# Patient Record
Sex: FEMALE | Race: BLACK OR AFRICAN AMERICAN | URBAN - METROPOLITAN AREA
[De-identification: names, ages, dates, MRNs, and addresses within clinical notes are randomized per-mention and may not be internally consistent; named-entity substitution may affect disease eponyms.]

---

## 2022-10-13 ENCOUNTER — OFFICE VISIT (OUTPATIENT)
Dept: ORTHOPEDIC SURGERY | Age: 62
End: 2022-10-13

## 2022-10-13 VITALS — WEIGHT: 170 LBS | HEIGHT: 62 IN | BODY MASS INDEX: 31.28 KG/M2

## 2022-10-13 DIAGNOSIS — M25.561 ACUTE PAIN OF RIGHT KNEE: Primary | ICD-10-CM

## 2022-10-13 RX ORDER — TRIAMTERENE AND HYDROCHLOROTHIAZIDE 37.5; 25 MG/1; MG/1
1 CAPSULE ORAL EVERY MORNING
COMMUNITY

## 2022-10-13 RX ORDER — POTASSIUM CHLORIDE 1500 MG/1
1 TABLET, FILM COATED, EXTENDED RELEASE ORAL DAILY
COMMUNITY
Start: 2022-09-15

## 2022-10-13 RX ORDER — GABAPENTIN 100 MG/1
2 CAPSULE ORAL DAILY
COMMUNITY
Start: 2022-08-09

## 2022-10-13 RX ORDER — ACETAZOLAMIDE 250 MG/1
250 TABLET ORAL DAILY
COMMUNITY
Start: 2020-11-02

## 2022-10-13 RX ORDER — MELOXICAM 15 MG/1
15 TABLET ORAL DAILY
COMMUNITY
Start: 2021-06-08

## 2022-10-13 RX ORDER — POTASSIUM CHLORIDE 20 MEQ/1
20 TABLET, EXTENDED RELEASE ORAL 2 TIMES DAILY
COMMUNITY
Start: 2017-02-13

## 2022-10-13 NOTE — PROGRESS NOTES
12 [facility]  History and Physical      Date:  10/13/2022    Name:  Riky Cunha  Address:  [address] [place] [address] [address]    :  1960      Age:   65 y.o. Chief Complaint    Knee Pain (Right knee pain)      History of Present Illness:  Riky Cunha is a 65 y.o. female who presents for right-sided lateral knee pain. She states that she had a meniscal tear in  that was treated with partial meniscectomy. Since that time she has had gradually worsening achy dull pain in the lateral aspect of her knee. She has done physical therapy in the past.  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 She has utilized an off  brace and found that this did give her benefit her symptoms. She has previously had a series of hyaluronic acid injections. Past Medical History:   Diagnosis Date    Kidney disease         No past surgical history on file. No family history on file. Social History     Socioeconomic History    Marital status:      Spouse name: None    Number of children: None    Years of education: None    Highest education level: None   Tobacco Use    Smoking status: Former    Smokeless tobacco: Never       Current Outpatient Medications   Medication Sig Dispense Refill    albuterol (PROVENTIL) (2.5 MG/3ML) 0.083% nebulizer solution Take 2.5 mg by nebulization every 6 hours as needed. fluticasone-salmeterol (ADVAIR DISKUS) 100-50 MCG/DOSE diskus inhaler Inhale 1 puff into the lungs every 12 hours. losartan (COZAAR) 50 MG tablet Take 50 mg by mouth daily. atorvastatin (LIPITOR) 20 MG tablet Take 20 mg by mouth daily. amLODIPine (NORVASC) 10 MG tablet Take 10 mg by mouth daily. traMADol (ULTRAM) 50 MG tablet Take 50 mg by mouth every 6 hours as needed. fluticasone (VERAMYST) 27.5 MCG/SPRAY nasal spray 2 sprays by Nasal route daily. Glucosamine-Chondroit-Vit C-Mn (GLUCOSAMINE 1500 COMPLEX PO) Take  by mouth. ferrous sulfate 325 (65 FE) MG tablet Take 325 mg by mouth daily (with breakfast). No current facility-administered medications for this visit. Allergies   Allergen Reactions    Atorvastatin Other (See Comments)     Myalgias    Lisinopril          Review of Systems:  A 14 point review of systems was completed by the patient and is available in the media section of the scanned medical record and was reviewed. Vital Signs:   Ht 5' 2\" (1.575 m)   Wt 170 lb (77.1 kg)   BMI 31.09 kg/m²     General Exam:    General: Crissy Rodrigues is a healthy and well appearing 65y.o. year old female who is sitting comfortably in our office in no acute distress. Neuro: Alert & Oriented x 3,  normal,  no focal deficits noted. Normal mood & affect. Eyes: Sclera clear  Ears: Normal external ear  Mouth: Patient wearing a mask  Pulm: Respirations unlabored and regular   Skin: Warm, well perfused      Knee Examination: Right    Inspection: Moderate effusion, otherwise no ecchymosis, discoloration, erythema, excessive warmth. no gross deformities, no signs of infection. Palpation: There is no patellofemoral crepitus, there is moderate lateral joint line tenderness. No other osseous or soft tissue tenderness around the knee. Negative calf tenderness    Range of Motion:   0-130 degrees without pain or difficulty. Appropriate patellar mobility. Strength:  5/5 quadriceps, 5/5 hamstrings    Special Tests: Pseudolaxity on the lateral side with incomplete correction towards neutral otherwise no ligament instability, valgus and varus stress test are stable at 0 and 30°. Lachman's exhibits a solid endpoint. Negative posterior drawer.   Negative Homans sign    Gait: antalgic, without the use of assistive devices    Alignment: Valgus alignment bilaterally    Neuro: Sensation equal bilateral lower extremities    Vascular: 2+ posterior tibialis pulse      Contralateral Knee Comparison Examination: Left    Inspection:   No effusion, ecchymosis, discoloration, erythema, excessive warmth. no gross deformities, no signs of infection. Palpation: There is no patellofemoral crepitus, there is no medial or lateral joint line tenderness. No other osseous or soft tissue tenderness around the knee. Negative calf tenderness    Range of Motion:   0-130 degrees without pain or difficulty. Appropriate patellar mobility. Strength:  5/5 quadriceps, 5/5 hamstrings    Special Tests:   No ligament instability, valgus and varus stress test are stable at 0 and 30°. Lachman's exhibits a solid endpoint. Negative posterior drawer. Negative Homans sign        Radiology:    X-rays obtained and reviewed in office: AP and merchant view of both knees and a lateral of the right knee. Impression: No fractures, dislocations, visible tumors, or signs of acute trauma. There is complete loss of joint space in the lateral tibiofemoral compartment with reasonably preserved medial joint space there's reasonably preserved joint spacing in the patellofemoral joints bilaterally. KL grade 4. Patella is riding appropriately in the trochlear groove with no subluxation or tilt noted. Office Procedures:  Orders Placed This Encounter   Procedures    XR KNEE RIGHT (3 VIEWS)     Standing Status:   Future     Number of Occurrences:   1     Standing Expiration Date:   10/13/2023     Order Specific Question:   Reason for exam:     Answer:   right knee pain            Assessment: This patient is a 65 y.o. 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 Acute pain of right knee Yes       No orders of the defined types were placed in this encounter.       Medical decision making:  Patient's workup and evaluation were reviewed with the patient in the office today. Imaging was reviewed with the patient. An extensive discussion today in clinic with respect to options going forwards. She does describe that she has had reasonable relief of symptoms when using an off  brace however finds her current off  brace to be too bulky. As such we will fit her and see if she is interested in utilizing the smaller off  brace that we have available today in clinic. She did also wish to proceed with cortisone injection today    Right Knee Injection Procedure: The indications and risks of steroid injection as well as treatment alternatives were discussed with the patient who consented to the procedure. Under sterile conditions and after informed consent was obtained the patient was given an injection into the right knee. 2cc 40 mg of Kenalog were placed in the knee after it was prepped with chlorhexidine. This resulted in good relief of symptoms. There were no complications. The patient was advised to ice the knee this evening and to avoid vigorous activities for the next 2 days. They were advised to call us if there was any erythema, enduration, swelling or increasing pain. We had a good discussion with her today with respect to expectations around total knee arthroplasty and addressed her questions. She understandably wishes to further research this before proceeding. All the patient's questions were answered while in the clinic. The patient is understanding of all instructions and agrees with the plan. Treatment Plan:    Cortisone injection today  Referral to PT  Off  brace  Follow-up in clinic on an as-needed basis should she wish to repeat cortisone injection or proceed with total knee arthroplasty  Activity modification/Rest   Ice 20 minutes ever 1-2 hours PRN  Take medications as prescribed/instructed  Elevation   Lightweight exercise/low impact exercise  Appropriate diet/weight management      Follow up:  On an as-needed basis    This dictation was performed with a verbal recognition program (DRAGON) and it was checked for errors. It is possible that there are still dictated errors within this office note. If so, please bring any errors to my attention for an addendum. All efforts were made to ensure that this office note is accurate. Supervising Physician Attestation:  I, Dr. Ailin Owens, personally performed the services described in this documentation as above, and it is both accurate and complete and I agree with all pertinent clinical information. I personally interviewed the patient and performed a physical examination and medical decision making. I discussed the patient's condition and treatment options and have  reviewed and agree with the past medical, family and social history unless otherwise noted. All of the patient's questions were answered. I personally supervised the Orthopedic and Sportsmedicine Fellow when when noted in the evaluation and treatment plan of the patient. Board Certified Orthopaedic Surgeon  [facility] and [facility] and [address] and [employer]  Professor of [address] [facility]    Orthopaedic Surgeon, Clinical Fellow  [facility] and [facility]   On behalf of      10/13/22 2:39 PM      The encounter with Isha Marley was supervised by Dr. Ailin Owens who personally examined the patient and reviewed the plan. This dictation was performed with a verbal recognition program (DRAGON) and it was checked for errors. It is possible that there are still dictated errors within this office note. If so, please bring any errors to my attention for an addendum. All efforts were made to ensure that this office note is accurate.     This patient exhibits moderate complexity for obtaining an independent history, reviewing past medical records, independent interpretation/review of diagnostic imaging, and further coordinating care. The patient exhibits low risk given that the patient is being treated conservatively at this time.

## 2023-06-06 ENCOUNTER — OFFICE VISIT (OUTPATIENT)
Dept: ORTHOPEDIC SURGERY | Age: 63
End: 2023-06-06

## 2023-06-06 VITALS — WEIGHT: 170 LBS | BODY MASS INDEX: 31.28 KG/M2 | HEIGHT: 62 IN

## 2023-06-06 DIAGNOSIS — M25.561 ACUTE PAIN OF RIGHT KNEE: Primary | ICD-10-CM

## 2023-06-06 NOTE — PROGRESS NOTES
mobility. Strength:  5/5 quadriceps, 5/5 hamstrings    Special Tests:  No ligament instability, valgus and varus stress test are stable at 0 and 30°. Lachman's exhibits a solid endpoint. Negative posterior drawer. Negative Homans sign    Gait: Non antalgic, without the use of assistive devices    Alignment: Correctable valgus deformity    Neuro: Sensation equal bilateral lower extremities    Vascular: 2+ posterior tibialis pulse      Contralateral Knee Comparison Examination: Left    Inspection:   No effusion, ecchymosis, discoloration, erythema, excessive warmth. no gross deformities, no signs of infection. Palpation: There is mild patellofemoral crepitus, there is no joint line tenderness. No other osseous or soft tissue tenderness around the knee. Negative calf tenderness    Range of Motion:   -5-130 degrees without pain or difficulty. Appropriate patellar mobility. Strength:  5/5 quadriceps, 5/5 hamstrings    Special Tests:   No ligament instability, valgus and varus stress test are stable at 0 and 30°. Lachman's exhibits a solid endpoint. Negative posterior drawer. Negative Homans sign    Gait:  Non antalgic, without the use of assistive devices    Alignment: mild Varus deformity    Neuro: Sensation equal bilateral lower extremities    Vascular: 2+ posterior tibialis pulse        Radiology:   X-rays obtained and reviewed in office: AP and merchant view of both knees and a lateral of the right. Impression: No fractures, dislocations, visible tumors, or signs of acute trauma. The lateral femoral-tibial compartments exhibit complete loss of joint space. There's maintained joint spacing in the patellofemoral joints bilaterally. KL grade 4. Patella is riding appropriately in the trochlear groove with no subluxation or tilt noted. No osteophytes or bone growths noted. No loose bodies or foreign bodies.         Office Procedures:  Orders Placed This Encounter   Procedures    XR KNEE RIGHT (3

## 2023-06-07 DIAGNOSIS — M25.561 ACUTE PAIN OF RIGHT KNEE: Primary | ICD-10-CM

## 2023-06-26 ENCOUNTER — TELEPHONE (OUTPATIENT)
Dept: PULMONOLOGY | Age: 63
End: 2023-06-26

## 2023-06-26 DIAGNOSIS — J45.909 UNCOMPLICATED ASTHMA, UNSPECIFIED ASTHMA SEVERITY, UNSPECIFIED WHETHER PERSISTENT: Primary | ICD-10-CM

## 2023-06-27 ENCOUNTER — TELEPHONE (OUTPATIENT)
Dept: ORTHOPEDIC SURGERY | Age: 63
End: 2023-06-27

## 2023-06-27 DIAGNOSIS — Z01.818 PRE-OP EVALUATION: Primary | ICD-10-CM

## 2023-06-27 NOTE — TELEPHONE ENCOUNTER
Other PATIENT CALLED TO VERIFY IF SHE NEEDS A CHEST XRAY. STATES THAT SHE WILL NEED TO KNOW TODAY AND IF SO SHE COULD GET IT TODAY AS WELL.  PLS CALL TO ADVISE

## 2023-07-03 ENCOUNTER — TELEPHONE (OUTPATIENT)
Dept: ORTHOPEDIC SURGERY | Age: 63
End: 2023-07-03

## 2023-07-03 NOTE — TELEPHONE ENCOUNTER
PA requsted via [employer] by online thru [address] [facility] 69 w/clinicals.   Reference # [id number]

## 2023-07-07 ENCOUNTER — HOSPITAL ENCOUNTER (OUTPATIENT)
Dept: MRI IMAGING | Age: 63
Discharge: HOME | End: 2023-07-07
Attending: ORTHOPAEDIC SURGERY
Payer: COMMERCIAL

## 2023-07-07 ENCOUNTER — HOSPITAL ENCOUNTER (OUTPATIENT)
Dept: GENERAL RADIOLOGY | Age: 63
Discharge: HOME | End: 2023-07-07
Attending: ORTHOPAEDIC SURGERY
Payer: COMMERCIAL

## 2023-07-07 ENCOUNTER — TELEPHONE (OUTPATIENT)
Dept: ORTHOPEDIC SURGERY | Age: 63
End: 2023-07-07

## 2023-07-07 DIAGNOSIS — M25.561 ACUTE PAIN OF RIGHT KNEE: ICD-10-CM

## 2023-07-07 DIAGNOSIS — M17.11 OSTEOARTHRITIS OF RIGHT KNEE, UNSPECIFIED OSTEOARTHRITIS TYPE: ICD-10-CM

## 2023-07-07 DIAGNOSIS — Z01.818 PRE-OP EVALUATION: ICD-10-CM

## 2023-07-07 DIAGNOSIS — M17.11 OSTEOARTHRITIS OF RIGHT KNEE, UNSPECIFIED OSTEOARTHRITIS TYPE: Primary | ICD-10-CM

## 2023-07-07 PROCEDURE — 73721 MRI JNT OF LWR EXTRE W/O DYE: CPT

## 2023-07-07 PROCEDURE — 77073 BONE LENGTH STUDIES: CPT

## 2023-07-07 PROCEDURE — 71046 X-RAY EXAM CHEST 2 VIEWS: CPT

## 2023-07-07 NOTE — TELEPHONE ENCOUNTER
General Question     Subject: CHECKLIST - PRE OP   Patient and /or Facility Request: Brent Desir Number: [phone]     Pt HAS DONE MRI AND CHEST XR AND KNEE. SHE HAS PRE OP PHYS SCHEDULED FOR 07/18.      IS THERE ANYTHING ELSE THAT SHE NEEDS TO DO?

## 2023-07-07 NOTE — TELEPHONE ENCOUNTER
Auth: NPR  Date: 07/26/23 thru 10/26/23  Reference # [id number]  Spoke with: Online  Type of SX: Outpatient  Location: [facility]  CPT: 53488   DX: M17.11  SX area: Rt knee  Insurance: [employer] [place]

## 2023-07-18 ENCOUNTER — TELEPHONE (OUTPATIENT)
Dept: ORTHOPEDIC SURGERY | Age: 63
End: 2023-07-18

## 2023-07-18 NOTE — TELEPHONE ENCOUNTER
Orthopedic Nurse Navigator Summary    Patient Name:  Alaina Augustin  Anticipated Date of Surgery:  07/26/23  Attended Pre-op Education Class:  Video sent to patient email 06/26/23  PCP: Trudy Rodríguez MD  Date of PCP visit for H&P: 07/18/23  Is patient in a Pain Management program:  Review of Medical history reveals history of: Diabetes, HTN, Hyperlipidemia, Carotid atherosclerosis, Asthma, FSGS, Spinal stenosis    Critical Lab Values  - Hemoglobin (g/dL):  Date: 04/12/23 Value 9.7  - Hematocrit(%): Date: 04/12/23  Value 29.4  - HgbA1C:  Date: 03/30/23 Value 6.1  - Albumin:  Date:   Value   - BUN:  Date: 07/13/23  Value 21  - Creatinine:  Date: 07/13/23  Value 1.04    Coronary Artery Disease/HTN/CHF history: Yes  Cardiologist:  Cardiac clearance necessary:  No  Date of cardiac clearance appt:  Final Cardiac recommendations: On any anticoagulation: No    Diabetes History:  Yes  Most recent HgbA1C: 6.1  Pulmonary:  COPD/Emphysema/Use of home oxygen: Patient has asthma  Alcohol use: Yes    BMI greater than 40 at time of scheduling: Additional medical concerns:   Additional recommendations for above concerns:  Attended Pre-Hab program:    Anticipated Discharge Disposition:  Home with OPT  Who will be with patient at home following discharge:    Equipment patient already has:    Bedroom on first or second floor:    Bathroom on first or second floor:    Weight bearing status:  wbat  Pre-op ambulatory status: painful ambulation  Number of entry steps:    Caregiver assistance:      Belinda Lincoln RN  Date:  07/18/23

## 2023-07-21 NOTE — PROGRESS NOTES
[facility] PRE-SURGICAL TESTING INSTRUCTIONS                      PRIOR TO PROCEDURE DATE:    1. PLEASE FOLLOW ANY INSTRUCTIONS GIVEN TO YOU PER YOUR SURGEON. 2. Arrange for someone to drive you home and be with you for the first 24 hours after discharge for your safety after your procedure for which you received sedation. Ensure it is someone we can share information with regarding your discharge. NOTE: At this time ONLY 2 ADULTS may accompany you   One person ENCOURAGED to stay at hospital entire time if outpatient surgery      3. You must contact your surgeon for instructions IF:  You are taking any blood thinners, aspirin, anti-inflammatory or vitamins. There is a change in your physical condition such as a cold, fever, rash, cuts, sores, or any other infection, especially near your surgical site. 4. Do not drink alcohol the day before or day of your procedure. Do not use any recreational marijuana at least 24 hours or street drugs (heroin, cocaine) at minimum 5 days prior to your procedure. 5. A Pre-Surgical History and Physical MUST be completed WITHIN 30 DAYS OR LESS prior to your procedure. by your Physician or an Urgent Care        THE DAY OF YOUR PROCEDURE:  1. Follow instructions for ARRIVAL TIME as DIRECTED BY YOUR SURGEON. 2. Enter the MAIN entrance from [address] and follow the signs to the free Parking [employer] or [employer] (offered free of charge 7 am-5pm). 3. Enter the Main Entrance of the hospital (do not enter from the lower level of the parking garage). Upon entrance, check in with the  at the surgical information desk on your LEFT. Bring your insurance card and photo ID to register      4. DO NOT EAT ANYTHING 8 hours prior to arrival for surgery. You may have up to 8 ounces of water 4 hours prior to your arrival for surgery.    NOTE: ALL Gastric, Bariatric & Bowel surgery patients - you MUST follow your surgeon's instructions regarding please bring it with you on the day of your procedure. 10. If you use oxygen at home, please bring your oxygen tank with you to hospital..     11. We recommend that valuable personal belongings such as cash, cell phones, e-tablets, or jewelry, be left at home during your stay. The hospital will not be responsible for valuables that are not secured in the hospital safe. However, if your insurance requires a co-pay, you may want to bring a method of payment, i.e., Check or credit card, if you wish to pay your co-pay the day of surgery. 12. If you are to stay overnight, you may bring a bag with personal items. Please have any large items you may need brought in by your family after your arrival to your hospital room. 15. If you have a Living Will or Durable Power of , please bring a copy on the day of your procedure. How we keep you safe and work to prevent surgical site infections:   1. Health care workers should always check your ID bracelet to verify your name and birth date. You will be asked many times to state your name, date of birth, and allergies. 2. Health care workers should always clean their hands with soap or alcohol gel before providing care to you. It is okay to ask anyone if they cleaned their hands before they touch you. 3. You will be actively involved in verifying the type of procedure you are having and ensuring the correct surgical site. This will be confirmed multiple times prior to your procedure. Do NOT son your surgery site UNLESS instructed to by your surgeon. 4. When you are in the operating room, your surgical site will be cleansed with a special soap, and in most cases, you will be given an antibiotic before the surgery begins. What to expect AFTER your procedure? 1. Immediately following your procedure, your will be taken to the PACU for the first phase of your recovery.   Your nurse will help you recover from any potential side effects of

## 2023-07-21 NOTE — PROGRESS NOTES
7/21 ABNORMAL LABS FAXED TO DR Samir Albrecht OFFICE-    7/21 0917 Office notified unable to reach patient. Office staff Dilcia weeks gave patient preop instructions and packet, ALSO SHE SAID SHE WILL ASK PT IF SHE GETS HER TO CALL [facility]  -    7/21 1145 SPOKE W PT, Total Joint video emailed & reviewed to patient by [address]. Hibiclens instructions reviewed to use x 5 days preop. JOSE screening done. TJ book, IS instructions, TJ video link, and fall contract placed on chart for DOS. -DG    7/25/23 @ 43059 Messaged E Thang at surgeon office and asked if he  needs any further labs on pt   /NR     7/25/23 @ 1310  ETheodore Desir put  order in for CBCD to be drawn today  /NR

## 2023-07-25 ENCOUNTER — HOSPITAL ENCOUNTER (OUTPATIENT)
Dept: PHYSICAL THERAPY | Age: 63
Setting detail: THERAPIES SERIES
Discharge: HOME | End: 2023-07-25
Payer: COMMERCIAL

## 2023-07-25 ENCOUNTER — ANESTHESIA EVENT (OUTPATIENT)
Dept: OPERATING ROOM | Age: 63
End: 2023-07-25
Payer: COMMERCIAL

## 2023-07-25 ENCOUNTER — TELEPHONE (OUTPATIENT)
Dept: ORTHOPEDIC SURGERY | Age: 63
End: 2023-07-25

## 2023-07-25 ENCOUNTER — OFFICE VISIT (OUTPATIENT)
Dept: ORTHOPEDIC SURGERY | Age: 63
End: 2023-07-25

## 2023-07-25 VITALS — HEIGHT: 62 IN | WEIGHT: 187 LBS | BODY MASS INDEX: 34.41 KG/M2

## 2023-07-25 DIAGNOSIS — M25.561 ACUTE PAIN OF RIGHT KNEE: ICD-10-CM

## 2023-07-25 DIAGNOSIS — M17.0 PRIMARY OSTEOARTHRITIS OF BOTH KNEES: ICD-10-CM

## 2023-07-25 DIAGNOSIS — Z01.818 PRE-OP EXAMINATION: Primary | ICD-10-CM

## 2023-07-25 DIAGNOSIS — Z01.818 PRE-OP EXAMINATION: ICD-10-CM

## 2023-07-25 PROCEDURE — PREOPEXAM PRE-OP EXAM: Performed by: PHYSICIAN ASSISTANT

## 2023-07-25 PROCEDURE — 97110 THERAPEUTIC EXERCISES: CPT | Performed by: PHYSICAL THERAPIST

## 2023-07-25 PROCEDURE — 97530 THERAPEUTIC ACTIVITIES: CPT | Performed by: PHYSICAL THERAPIST

## 2023-07-25 PROCEDURE — MISC250 COMPRESSION STOCKING: Performed by: PHYSICIAN ASSISTANT

## 2023-07-26 ENCOUNTER — HOSPITAL ENCOUNTER (OUTPATIENT)
Age: 63
Setting detail: OBSERVATION
Discharge: HOME | End: 2023-07-28
Attending: ORTHOPAEDIC SURGERY | Admitting: ORTHOPAEDIC SURGERY
Payer: COMMERCIAL

## 2023-07-26 ENCOUNTER — ANESTHESIA (OUTPATIENT)
Dept: OPERATING ROOM | Age: 63
End: 2023-07-26
Payer: COMMERCIAL

## 2023-07-26 DIAGNOSIS — Z98.890 S/P KNEE SURGERY: Primary | ICD-10-CM

## 2023-07-26 DIAGNOSIS — Z96.651 S/P TOTAL KNEE ARTHROPLASTY, RIGHT: ICD-10-CM

## 2023-07-26 LAB
BASOPHILS # BLD: 0 K/UL (ref 0–0.2)
BASOPHILS NFR BLD: 0.8 %
DEPRECATED RDW RBC AUTO: 14.7 % (ref 12.4–15.4)
EOSINOPHIL # BLD: 0.3 K/UL (ref 0–0.6)
EOSINOPHIL NFR BLD: 5.4 %
HCT VFR BLD AUTO: 41.1 % (ref 36–48)
HGB BLD-MCNC: 13.6 G/DL (ref 12–16)
LYMPHOCYTES # BLD: 2.1 K/UL (ref 1–5.1)
LYMPHOCYTES NFR BLD: 39 %
MCH RBC QN AUTO: 29.1 PG (ref 26–34)
MCHC RBC AUTO-ENTMCNC: 33 G/DL (ref 31–36)
MCV RBC AUTO: 88.1 FL (ref 80–100)
MONOCYTES # BLD: 0.4 K/UL (ref 0–1.3)
MONOCYTES NFR BLD: 7.7 %
NEUTROPHILS # BLD: 2.6 K/UL (ref 1.7–7.7)
NEUTROPHILS NFR BLD: 47.1 %
PLATELET # BLD AUTO: 307 K/UL (ref 135–450)
PMV BLD AUTO: 8.5 FL (ref 5–10.5)
RBC # BLD AUTO: 4.66 M/UL (ref 4–5.2)
WBC # BLD AUTO: 5.5 K/UL (ref 4–11)

## 2023-07-26 PROCEDURE — 3600000015 HC SURGERY LEVEL 5 ADDTL 15MIN: Performed by: ORTHOPAEDIC SURGERY

## 2023-07-26 PROCEDURE — C1713 ANCHOR/SCREW BN/BN,TIS/BN: HCPCS | Performed by: ORTHOPAEDIC SURGERY

## 2023-07-26 PROCEDURE — 6360000002 HC RX W HCPCS: Performed by: NURSE ANESTHETIST, CERTIFIED REGISTERED

## 2023-07-26 PROCEDURE — 6360000002 HC RX W HCPCS: Performed by: ANESTHESIOLOGY

## 2023-07-26 PROCEDURE — C9399 UNCLASSIFIED DRUGS OR BIOLOG: HCPCS | Performed by: NURSE ANESTHETIST, CERTIFIED REGISTERED

## 2023-07-26 PROCEDURE — 6370000000 HC RX 637 (ALT 250 FOR IP): Performed by: ANESTHESIOLOGY

## 2023-07-26 PROCEDURE — 2500000003 HC RX 250 WO HCPCS: Performed by: ORTHOPAEDIC SURGERY

## 2023-07-26 PROCEDURE — A4216 STERILE WATER/SALINE, 10 ML: HCPCS | Performed by: ORTHOPAEDIC SURGERY

## 2023-07-26 PROCEDURE — 2700000000 HC OXYGEN THERAPY PER DAY

## 2023-07-26 PROCEDURE — C1776 JOINT DEVICE (IMPLANTABLE): HCPCS | Performed by: ORTHOPAEDIC SURGERY

## 2023-07-26 PROCEDURE — 6370000000 HC RX 637 (ALT 250 FOR IP): Performed by: ORTHOPAEDIC SURGERY

## 2023-07-26 PROCEDURE — 2709999900 HC NON-CHARGEABLE SUPPLY: Performed by: ORTHOPAEDIC SURGERY

## 2023-07-26 PROCEDURE — 3600000005 HC SURGERY LEVEL 5 BASE: Performed by: ORTHOPAEDIC SURGERY

## 2023-07-26 PROCEDURE — 6360000002 HC RX W HCPCS: Performed by: ORTHOPAEDIC SURGERY

## 2023-07-26 PROCEDURE — 7100000001 HC PACU RECOVERY - ADDTL 15 MIN: Performed by: ORTHOPAEDIC SURGERY

## 2023-07-26 PROCEDURE — 2580000003 HC RX 258: Performed by: ANESTHESIOLOGY

## 2023-07-26 PROCEDURE — 6360000002 HC RX W HCPCS: Performed by: NURSE PRACTITIONER

## 2023-07-26 PROCEDURE — 2580000003 HC RX 258: Performed by: ORTHOPAEDIC SURGERY

## 2023-07-26 PROCEDURE — G0378 HOSPITAL OBSERVATION PER HR: HCPCS

## 2023-07-26 PROCEDURE — 94761 N-INVAS EAR/PLS OXIMETRY MLT: CPT

## 2023-07-26 PROCEDURE — 94640 AIRWAY INHALATION TREATMENT: CPT

## 2023-07-26 PROCEDURE — 7100000000 HC PACU RECOVERY - FIRST 15 MIN: Performed by: ORTHOPAEDIC SURGERY

## 2023-07-26 PROCEDURE — 3700000000 HC ANESTHESIA ATTENDED CARE: Performed by: ORTHOPAEDIC SURGERY

## 2023-07-26 PROCEDURE — 2500000003 HC RX 250 WO HCPCS: Performed by: NURSE ANESTHETIST, CERTIFIED REGISTERED

## 2023-07-26 PROCEDURE — 64447 NJX AA&/STRD FEMORAL NRV IMG: CPT | Performed by: ANESTHESIOLOGY

## 2023-07-26 PROCEDURE — 2580000003 HC RX 258: Performed by: NURSE ANESTHETIST, CERTIFIED REGISTERED

## 2023-07-26 PROCEDURE — A4217 STERILE WATER/SALINE, 500 ML: HCPCS | Performed by: ORTHOPAEDIC SURGERY

## 2023-07-26 PROCEDURE — 3700000001 HC ADD 15 MINUTES (ANESTHESIA): Performed by: ORTHOPAEDIC SURGERY

## 2023-07-26 DEVICE — JOURNEY 7.5 ROUND RESURF PAT 32MM STANDARD
Type: IMPLANTABLE DEVICE | Site: KNEE | Status: FUNCTIONAL
Brand: JOURNEY

## 2023-07-26 DEVICE — KNEE K1 TOT HEMI STD CEM IMPL CAPPED K1 SN: Type: IMPLANTABLE DEVICE | Site: KNEE | Status: FUNCTIONAL

## 2023-07-26 DEVICE — JOURNEY II BCS FEMORAL OXINIUM                                    RIGHT SIZE 4
Type: IMPLANTABLE DEVICE | Site: KNEE | Status: FUNCTIONAL
Brand: JOURNEY

## 2023-07-26 DEVICE — CEMENT BNE 20ML 40GM FULL DOSE PMMA W/O ANTIBIO M VISC: Type: IMPLANTABLE DEVICE | Site: KNEE | Status: FUNCTIONAL

## 2023-07-26 DEVICE — JOURNEY TIBIAL BASEPLATE NONPOROUS                                    RIGHT SIZE 3
Type: IMPLANTABLE DEVICE | Site: KNEE | Status: FUNCTIONAL
Brand: JOURNEY

## 2023-07-26 RX ORDER — HYDRALAZINE HYDROCHLORIDE 20 MG/ML
10 INJECTION INTRAMUSCULAR; INTRAVENOUS
Status: COMPLETED | OUTPATIENT
Start: 2023-07-26 | End: 2023-07-26

## 2023-07-26 RX ORDER — SODIUM CHLORIDE 0.9 % (FLUSH) 0.9 %
5-40 SYRINGE (ML) INJECTION PRN
Status: DISCONTINUED | OUTPATIENT
Start: 2023-07-26 | End: 2023-07-28 | Stop reason: HOSPADM

## 2023-07-26 RX ORDER — ACETAMINOPHEN 325 MG/1
650 TABLET ORAL EVERY 6 HOURS
Status: DISCONTINUED | OUTPATIENT
Start: 2023-07-26 | End: 2023-07-28 | Stop reason: HOSPADM

## 2023-07-26 RX ORDER — OXYCODONE HYDROCHLORIDE 5 MG/1
10 TABLET ORAL PRN
Status: COMPLETED | OUTPATIENT
Start: 2023-07-26 | End: 2023-07-26

## 2023-07-26 RX ORDER — SODIUM CHLORIDE 0.9 % (FLUSH) 0.9 %
5-40 SYRINGE (ML) INJECTION EVERY 12 HOURS SCHEDULED
Status: DISCONTINUED | OUTPATIENT
Start: 2023-07-26 | End: 2023-07-26 | Stop reason: HOSPADM

## 2023-07-26 RX ORDER — ONDANSETRON 4 MG/1
4 TABLET, ORALLY DISINTEGRATING ORAL 3 TIMES DAILY PRN
Qty: 21 TABLET | Refills: 0 | Status: SHIPPED | OUTPATIENT
Start: 2023-07-26

## 2023-07-26 RX ORDER — ROCURONIUM BROMIDE 10 MG/ML
INJECTION, SOLUTION INTRAVENOUS PRN
Status: DISCONTINUED | OUTPATIENT
Start: 2023-07-26 | End: 2023-07-26 | Stop reason: SDUPTHER

## 2023-07-26 RX ORDER — SODIUM CHLORIDE 0.9 % (FLUSH) 0.9 %
5-40 SYRINGE (ML) INJECTION EVERY 12 HOURS SCHEDULED
Status: DISCONTINUED | OUTPATIENT
Start: 2023-07-26 | End: 2023-07-28 | Stop reason: HOSPADM

## 2023-07-26 RX ORDER — OXYCODONE HYDROCHLORIDE AND ACETAMINOPHEN 5; 325 MG/1; MG/1
1 TABLET ORAL EVERY 6 HOURS PRN
Qty: 28 TABLET | Refills: 0 | Status: SHIPPED | OUTPATIENT
Start: 2023-07-26 | End: 2023-07-28 | Stop reason: HOSPADM

## 2023-07-26 RX ORDER — IPRATROPIUM BROMIDE AND ALBUTEROL SULFATE 2.5; .5 MG/3ML; MG/3ML
1 SOLUTION RESPIRATORY (INHALATION)
Status: COMPLETED | OUTPATIENT
Start: 2023-07-26 | End: 2023-07-26

## 2023-07-26 RX ORDER — SODIUM CHLORIDE 9 MG/ML
INJECTION, SOLUTION INTRAVENOUS PRN
Status: DISCONTINUED | OUTPATIENT
Start: 2023-07-26 | End: 2023-07-26 | Stop reason: HOSPADM

## 2023-07-26 RX ORDER — OXYCODONE HYDROCHLORIDE 5 MG/1
5 TABLET ORAL EVERY 4 HOURS PRN
Status: DISCONTINUED | OUTPATIENT
Start: 2023-07-26 | End: 2023-07-28 | Stop reason: HOSPADM

## 2023-07-26 RX ORDER — MIDAZOLAM HYDROCHLORIDE 1 MG/ML
INJECTION INTRAMUSCULAR; INTRAVENOUS PRN
Status: DISCONTINUED | OUTPATIENT
Start: 2023-07-26 | End: 2023-07-26 | Stop reason: SDUPTHER

## 2023-07-26 RX ORDER — MEPERIDINE HYDROCHLORIDE 25 MG/ML
12.5 INJECTION INTRAMUSCULAR; INTRAVENOUS; SUBCUTANEOUS EVERY 5 MIN PRN
Status: DISCONTINUED | OUTPATIENT
Start: 2023-07-26 | End: 2023-07-26 | Stop reason: HOSPADM

## 2023-07-26 RX ORDER — ACETAMINOPHEN 325 MG/1
650 TABLET ORAL
Status: DISCONTINUED | OUTPATIENT
Start: 2023-07-26 | End: 2023-07-26 | Stop reason: HOSPADM

## 2023-07-26 RX ORDER — ASPIRIN 81 MG/1
81 TABLET ORAL 2 TIMES DAILY
Status: DISCONTINUED | OUTPATIENT
Start: 2023-07-27 | End: 2023-07-28 | Stop reason: HOSPADM

## 2023-07-26 RX ORDER — ACETAZOLAMIDE 250 MG/1
250 TABLET ORAL DAILY
Status: DISCONTINUED | OUTPATIENT
Start: 2023-07-27 | End: 2023-07-28 | Stop reason: HOSPADM

## 2023-07-26 RX ORDER — OXYCODONE HYDROCHLORIDE 5 MG/1
10 TABLET ORAL EVERY 4 HOURS PRN
Status: DISCONTINUED | OUTPATIENT
Start: 2023-07-26 | End: 2023-07-28 | Stop reason: HOSPADM

## 2023-07-26 RX ORDER — FENTANYL CITRATE 50 UG/ML
INJECTION, SOLUTION INTRAMUSCULAR; INTRAVENOUS PRN
Status: DISCONTINUED | OUTPATIENT
Start: 2023-07-26 | End: 2023-07-26 | Stop reason: SDUPTHER

## 2023-07-26 RX ORDER — ATORVASTATIN CALCIUM 20 MG/1
20 TABLET, FILM COATED ORAL DAILY
Status: DISCONTINUED | OUTPATIENT
Start: 2023-07-26 | End: 2023-07-28 | Stop reason: HOSPADM

## 2023-07-26 RX ORDER — SENNA AND DOCUSATE SODIUM 50; 8.6 MG/1; MG/1
1 TABLET, FILM COATED ORAL 2 TIMES DAILY
Status: DISCONTINUED | OUTPATIENT
Start: 2023-07-26 | End: 2023-07-28 | Stop reason: HOSPADM

## 2023-07-26 RX ORDER — MIDAZOLAM HYDROCHLORIDE 1 MG/ML
INJECTION INTRAMUSCULAR; INTRAVENOUS
Status: COMPLETED
Start: 2023-07-26 | End: 2023-07-26

## 2023-07-26 RX ORDER — TRANEXAMIC ACID 100 MG/ML
INJECTION, SOLUTION INTRAVENOUS PRN
Status: DISCONTINUED | OUTPATIENT
Start: 2023-07-26 | End: 2023-07-26 | Stop reason: HOSPADM

## 2023-07-26 RX ORDER — ONDANSETRON 4 MG/1
4 TABLET, ORALLY DISINTEGRATING ORAL EVERY 8 HOURS PRN
Status: DISCONTINUED | OUTPATIENT
Start: 2023-07-26 | End: 2023-07-28 | Stop reason: HOSPADM

## 2023-07-26 RX ORDER — PROCHLORPERAZINE EDISYLATE 5 MG/ML
5 INJECTION INTRAMUSCULAR; INTRAVENOUS
Status: DISCONTINUED | OUTPATIENT
Start: 2023-07-26 | End: 2023-07-26 | Stop reason: HOSPADM

## 2023-07-26 RX ORDER — SODIUM CHLORIDE 9 MG/ML
INJECTION, SOLUTION INTRAVENOUS PRN
Status: DISCONTINUED | OUTPATIENT
Start: 2023-07-26 | End: 2023-07-28 | Stop reason: HOSPADM

## 2023-07-26 RX ORDER — ALBUTEROL SULFATE 2.5 MG/3ML
2.5 SOLUTION RESPIRATORY (INHALATION) EVERY 4 HOURS PRN
Status: DISCONTINUED | OUTPATIENT
Start: 2023-07-26 | End: 2023-07-28 | Stop reason: HOSPADM

## 2023-07-26 RX ORDER — SODIUM CHLORIDE 0.9 % (FLUSH) 0.9 %
5-40 SYRINGE (ML) INJECTION PRN
Status: DISCONTINUED | OUTPATIENT
Start: 2023-07-26 | End: 2023-07-26 | Stop reason: HOSPADM

## 2023-07-26 RX ORDER — POTASSIUM CHLORIDE 20 MEQ/1
20 TABLET, EXTENDED RELEASE ORAL DAILY
Status: DISCONTINUED | OUTPATIENT
Start: 2023-07-27 | End: 2023-07-28 | Stop reason: HOSPADM

## 2023-07-26 RX ORDER — ROPIVACAINE HYDROCHLORIDE 5 MG/ML
INJECTION, SOLUTION EPIDURAL; INFILTRATION; PERINEURAL
Status: COMPLETED
Start: 2023-07-26 | End: 2023-07-26

## 2023-07-26 RX ORDER — ASPIRIN 81 MG/1
81 TABLET ORAL 2 TIMES DAILY
Qty: 60 TABLET | Refills: 0 | Status: SHIPPED | OUTPATIENT
Start: 2023-07-26 | End: 2023-08-25

## 2023-07-26 RX ORDER — ONDANSETRON 2 MG/ML
4 INJECTION INTRAMUSCULAR; INTRAVENOUS EVERY 6 HOURS PRN
Status: DISCONTINUED | OUTPATIENT
Start: 2023-07-26 | End: 2023-07-28 | Stop reason: HOSPADM

## 2023-07-26 RX ORDER — SODIUM CHLORIDE, SODIUM LACTATE, POTASSIUM CHLORIDE, CALCIUM CHLORIDE 600; 310; 30; 20 MG/100ML; MG/100ML; MG/100ML; MG/100ML
INJECTION, SOLUTION INTRAVENOUS CONTINUOUS PRN
Status: DISCONTINUED | OUTPATIENT
Start: 2023-07-26 | End: 2023-07-26 | Stop reason: SDUPTHER

## 2023-07-26 RX ORDER — DEXAMETHASONE SODIUM PHOSPHATE 10 MG/ML
INJECTION, SOLUTION INTRAMUSCULAR; INTRAVENOUS
Status: DISPENSED
Start: 2023-07-26 | End: 2023-07-26

## 2023-07-26 RX ORDER — PROCHLORPERAZINE EDISYLATE 5 MG/ML
10 INJECTION INTRAMUSCULAR; INTRAVENOUS EVERY 6 HOURS PRN
Status: DISCONTINUED | OUTPATIENT
Start: 2023-07-26 | End: 2023-07-28 | Stop reason: HOSPADM

## 2023-07-26 RX ORDER — LOSARTAN POTASSIUM 50 MG/1
50 TABLET ORAL DAILY
Status: DISCONTINUED | OUTPATIENT
Start: 2023-07-26 | End: 2023-07-28 | Stop reason: HOSPADM

## 2023-07-26 RX ORDER — LIDOCAINE HYDROCHLORIDE 20 MG/ML
INJECTION, SOLUTION INTRAVENOUS PRN
Status: DISCONTINUED | OUTPATIENT
Start: 2023-07-26 | End: 2023-07-26 | Stop reason: SDUPTHER

## 2023-07-26 RX ORDER — OXYCODONE HYDROCHLORIDE 5 MG/1
5 TABLET ORAL PRN
Status: COMPLETED | OUTPATIENT
Start: 2023-07-26 | End: 2023-07-26

## 2023-07-26 RX ORDER — ONDANSETRON 2 MG/ML
4 INJECTION INTRAMUSCULAR; INTRAVENOUS
Status: DISCONTINUED | OUTPATIENT
Start: 2023-07-26 | End: 2023-07-26 | Stop reason: HOSPADM

## 2023-07-26 RX ORDER — GABAPENTIN 100 MG/1
200 CAPSULE ORAL DAILY
Status: DISCONTINUED | OUTPATIENT
Start: 2023-07-27 | End: 2023-07-28 | Stop reason: HOSPADM

## 2023-07-26 RX ORDER — SODIUM CHLORIDE, SODIUM LACTATE, POTASSIUM CHLORIDE, CALCIUM CHLORIDE 600; 310; 30; 20 MG/100ML; MG/100ML; MG/100ML; MG/100ML
INJECTION, SOLUTION INTRAVENOUS CONTINUOUS
Status: DISCONTINUED | OUTPATIENT
Start: 2023-07-26 | End: 2023-07-26 | Stop reason: HOSPADM

## 2023-07-26 RX ORDER — LABETALOL HYDROCHLORIDE 5 MG/ML
10 INJECTION, SOLUTION INTRAVENOUS
Status: COMPLETED | OUTPATIENT
Start: 2023-07-26 | End: 2023-07-26

## 2023-07-26 RX ORDER — AMLODIPINE BESYLATE 10 MG/1
10 TABLET ORAL DAILY
Status: DISCONTINUED | OUTPATIENT
Start: 2023-07-27 | End: 2023-07-28 | Stop reason: HOSPADM

## 2023-07-26 RX ORDER — FENTANYL CITRATE 50 UG/ML
25 INJECTION, SOLUTION INTRAMUSCULAR; INTRAVENOUS EVERY 5 MIN PRN
Status: COMPLETED | OUTPATIENT
Start: 2023-07-26 | End: 2023-07-26

## 2023-07-26 RX ORDER — PROPOFOL 10 MG/ML
INJECTION, EMULSION INTRAVENOUS PRN
Status: DISCONTINUED | OUTPATIENT
Start: 2023-07-26 | End: 2023-07-26 | Stop reason: SDUPTHER

## 2023-07-26 RX ORDER — SENNOSIDES A AND B 8.6 MG/1
1 TABLET, FILM COATED ORAL 2 TIMES DAILY
Qty: 60 TABLET | Refills: 0 | Status: SHIPPED | OUTPATIENT
Start: 2023-07-26 | End: 2024-07-25

## 2023-07-26 RX ORDER — ROPIVACAINE HYDROCHLORIDE 5 MG/ML
INJECTION, SOLUTION EPIDURAL; INFILTRATION; PERINEURAL
Status: COMPLETED | OUTPATIENT
Start: 2023-07-26 | End: 2023-07-26

## 2023-07-26 RX ADMIN — IPRATROPIUM BROMIDE AND ALBUTEROL SULFATE 1 DOSE: 2.5; .5 SOLUTION RESPIRATORY (INHALATION) at 16:58

## 2023-07-26 RX ADMIN — SENNOSIDES AND DOCUSATE SODIUM 1 TABLET: 50; 8.6 TABLET ORAL at 21:12

## 2023-07-26 RX ADMIN — LABETALOL HYDROCHLORIDE 10 MG: 5 INJECTION, SOLUTION INTRAVENOUS at 16:31

## 2023-07-26 RX ADMIN — ROPIVACAINE HYDROCHLORIDE 30 ML: 5 INJECTION, SOLUTION EPIDURAL; INFILTRATION; PERINEURAL at 10:55

## 2023-07-26 RX ADMIN — HYDROMORPHONE HYDROCHLORIDE 0.5 MG: 1 INJECTION, SOLUTION INTRAMUSCULAR; INTRAVENOUS; SUBCUTANEOUS at 14:49

## 2023-07-26 RX ADMIN — HYDROMORPHONE HYDROCHLORIDE 0.5 MG: 1 INJECTION, SOLUTION INTRAMUSCULAR; INTRAVENOUS; SUBCUTANEOUS at 21:02

## 2023-07-26 RX ADMIN — FENTANYL CITRATE 25 MCG: 50 INJECTION, SOLUTION INTRAMUSCULAR; INTRAVENOUS at 17:08

## 2023-07-26 RX ADMIN — FENTANYL CITRATE 100 MCG: 50 INJECTION, SOLUTION INTRAMUSCULAR; INTRAVENOUS at 11:51

## 2023-07-26 RX ADMIN — FENTANYL CITRATE 25 MCG: 50 INJECTION, SOLUTION INTRAMUSCULAR; INTRAVENOUS at 16:30

## 2023-07-26 RX ADMIN — LIDOCAINE HYDROCHLORIDE 100 MG: 20 INJECTION, SOLUTION INTRAVENOUS at 11:55

## 2023-07-26 RX ADMIN — PROPOFOL 80 MG: 10 INJECTION, EMULSION INTRAVENOUS at 11:58

## 2023-07-26 RX ADMIN — SUGAMMADEX 200 MG: 100 INJECTION, SOLUTION INTRAVENOUS at 14:10

## 2023-07-26 RX ADMIN — CEFAZOLIN 2000 MG: 2 INJECTION, POWDER, FOR SOLUTION INTRAMUSCULAR; INTRAVENOUS at 11:51

## 2023-07-26 RX ADMIN — PROCHLORPERAZINE EDISYLATE 10 MG: 5 INJECTION, SOLUTION INTRAMUSCULAR; INTRAVENOUS at 22:49

## 2023-07-26 RX ADMIN — SODIUM CHLORIDE, PRESERVATIVE FREE 10 ML: 5 INJECTION INTRAVENOUS at 21:04

## 2023-07-26 RX ADMIN — ROCURONIUM BROMIDE 100 MG: 10 INJECTION INTRAVENOUS at 11:58

## 2023-07-26 RX ADMIN — HYDROMORPHONE HYDROCHLORIDE 0.5 MG: 1 INJECTION, SOLUTION INTRAMUSCULAR; INTRAVENOUS; SUBCUTANEOUS at 14:56

## 2023-07-26 RX ADMIN — OXYCODONE HYDROCHLORIDE 10 MG: 5 TABLET ORAL at 17:58

## 2023-07-26 RX ADMIN — MIDAZOLAM HYDROCHLORIDE 2 MG: 2 INJECTION, SOLUTION INTRAMUSCULAR; INTRAVENOUS at 10:55

## 2023-07-26 RX ADMIN — SODIUM CHLORIDE, SODIUM LACTATE, POTASSIUM CHLORIDE, AND CALCIUM CHLORIDE: .6; .31; .03; .02 INJECTION, SOLUTION INTRAVENOUS at 11:51

## 2023-07-26 RX ADMIN — LABETALOL HYDROCHLORIDE 10 MG: 5 INJECTION, SOLUTION INTRAVENOUS at 15:46

## 2023-07-26 RX ADMIN — TRANEXAMIC ACID 1000 MG: 100 INJECTION, SOLUTION INTRAVENOUS at 13:43

## 2023-07-26 RX ADMIN — ONDANSETRON 4 MG: 2 INJECTION INTRAMUSCULAR; INTRAVENOUS at 18:32

## 2023-07-26 RX ADMIN — HYDROMORPHONE HYDROCHLORIDE 0.5 MG: 1 INJECTION, SOLUTION INTRAMUSCULAR; INTRAVENOUS; SUBCUTANEOUS at 15:27

## 2023-07-26 RX ADMIN — SODIUM CHLORIDE, POTASSIUM CHLORIDE, SODIUM LACTATE AND CALCIUM CHLORIDE: 600; 310; 30; 20 INJECTION, SOLUTION INTRAVENOUS at 10:34

## 2023-07-26 RX ADMIN — CEFAZOLIN 2000 MG: 2 INJECTION, POWDER, FOR SOLUTION INTRAMUSCULAR; INTRAVENOUS at 21:17

## 2023-07-26 RX ADMIN — HYDROMORPHONE HYDROCHLORIDE 0.5 MG: 1 INJECTION, SOLUTION INTRAMUSCULAR; INTRAVENOUS; SUBCUTANEOUS at 15:49

## 2023-07-26 ASSESSMENT — PAIN DESCRIPTION - ORIENTATION
ORIENTATION: RIGHT

## 2023-07-26 ASSESSMENT — PAIN DESCRIPTION - FREQUENCY
FREQUENCY: CONTINUOUS

## 2023-07-26 ASSESSMENT — PAIN DESCRIPTION - PAIN TYPE
TYPE: SURGICAL PAIN

## 2023-07-26 ASSESSMENT — PAIN DESCRIPTION - ONSET
ONSET: ON-GOING

## 2023-07-26 ASSESSMENT — PAIN SCALES - GENERAL
PAINLEVEL_OUTOF10: 0
PAINLEVEL_OUTOF10: 7
PAINLEVEL_OUTOF10: 7
PAINLEVEL_OUTOF10: 0
PAINLEVEL_OUTOF10: 0
PAINLEVEL_OUTOF10: 5
PAINLEVEL_OUTOF10: 0
PAINLEVEL_OUTOF10: 7
PAINLEVEL_OUTOF10: 5
PAINLEVEL_OUTOF10: 2
PAINLEVEL_OUTOF10: 0
PAINLEVEL_OUTOF10: 7
PAINLEVEL_OUTOF10: 7
PAINLEVEL_OUTOF10: 5
PAINLEVEL_OUTOF10: 6
PAINLEVEL_OUTOF10: 0
PAINLEVEL_OUTOF10: 10
PAINLEVEL_OUTOF10: 0
PAINLEVEL_OUTOF10: 9
PAINLEVEL_OUTOF10: 0

## 2023-07-26 ASSESSMENT — PAIN DESCRIPTION - LOCATION
LOCATION: LEG
LOCATION: KNEE

## 2023-07-26 ASSESSMENT — PAIN - FUNCTIONAL ASSESSMENT
PAIN_FUNCTIONAL_ASSESSMENT: PREVENTS OR INTERFERES SOME ACTIVE ACTIVITIES AND ADLS
PAIN_FUNCTIONAL_ASSESSMENT: PREVENTS OR INTERFERES SOME ACTIVE ACTIVITIES AND ADLS

## 2023-07-26 ASSESSMENT — PAIN DESCRIPTION - DESCRIPTORS
DESCRIPTORS: ACHING

## 2023-07-26 ASSESSMENT — ENCOUNTER SYMPTOMS: SHORTNESS OF BREATH: 0

## 2023-07-26 NOTE — PROGRESS NOTES
Patient arrived to PACU post RIGHT TOTAL KNEE REPLACEMENT - Right with Dr. Saima Churchill. VSS on arrival with oral airway and non-rebreather in place. CRNA gave PACU RN report at bedside stating patient received nerve block, otherwise, no complications during procedure. Dressing to surgical site clean, dry and intact. Patient shows no signs of pain at this time. Will continue to monitor.

## 2023-07-26 NOTE — ANESTHESIA POSTPROCEDURE EVALUATION
Department of Anesthesiology  Postprocedure Note    Patient: Guillermo Rubio  MRN: [id number]  YOB: 1960  Date of evaluation: 7/26/2023      Procedure Summary     Date: 07/26/23 Room / Location: Edda Rosenbaum [address] [address] On license of [facility]    Anesthesia Start: 5190 Anesthesia Stop: 3898    Procedure: RIGHT TOTAL KNEE REPLACEMENT (Right) Diagnosis:       Osteoarthritis of right knee, unspecified osteoarthritis type      (Osteoarthritis of right knee, unspecified osteoarthritis type [M17.11])    Surgeons: Sushila Shin MD Responsible Provider: Tawanna Thompson MD    Anesthesia Type: general ASA Status: 2          Anesthesia Type: No value filed.     Joi Phase I: Joi Score: 8    Joi Phase II:        Anesthesia Post Evaluation    Patient location during evaluation: PACU  Patient participation: complete - patient participated  Level of consciousness: awake and alert  Pain score: 0  Airway patency: patent  Nausea & Vomiting: no nausea and no vomiting  Complications: no  Cardiovascular status: hemodynamically stable  Respiratory status: acceptable  Hydration status: euvolemic

## 2023-07-26 NOTE — PROGRESS NOTES
Pt admitted to room 5506. VSS. Pt has been up and ambulating. Walked to bathroom and voided. All fall precautions in place. Call light within reach. Denies further needs.

## 2023-07-26 NOTE — PROGRESS NOTES
Procedure: peripheral block  MD: Dr. Kj Parra performed. Pt monitored closely on heart monitor, 2-3L NC, continuous pulse oximetry, EtCO2, and frequent BPs. Pt remained alert and oriented x4, calm and cooperative. pt tolerated procedure well.

## 2023-07-26 NOTE — BRIEF OP NOTE
Brief Postoperative Note      Patient: Park Lambert  YOB: 1960  MRN: [id number]    Date of Procedure: 7/26/2023    Pre-Op Diagnosis Codes:     * Osteoarthritis of right knee, unspecified osteoarthritis type [M17.11]    Post-Op Diagnosis: Same       Procedure(s):  RIGHT TOTAL KNEE REPLACEMENT    Surgeon(s):  Savanna Brown MD    Assistant:  Surgical Assistant: Beltran Mcknight  Fellow: Dto Cervantes MD  Physician Assistant: Yvonne Moon PA-C    Anesthesia: General    Estimated Blood Loss (mL): 770     Complications: None    Specimens:   * No specimens in log *    Implants:  * No implants in log *      Drains: * No LDAs found *    Findings: See dictated operative report.        Electronically signed by Dot Cervantes MD on 7/26/2023 at 1:56 PM

## 2023-07-26 NOTE — PROGRESS NOTES
Pt arrived from PACU. Pt able to ambulate with stand by assist, gait belt, and walker. Pt had 2 episodes of emesis that resolved with IV ondansetron and rest. Pts vitals are WDL. Pt resting comfortably in bed.

## 2023-07-26 NOTE — ANESTHESIA PROCEDURE NOTES
Peripheral Block    Patient location during procedure: pre-op  Reason for block: post-op pain management and at surgeon's request  Start time: 7/26/2023 10:55 AM  End time: 7/26/2023 11:00 AM  Staffing  Performed: anesthesiologist   Anesthesiologist: Adriel Mccord DO  Preanesthetic Checklist  Completed: patient identified, IV checked, site marked, risks and benefits discussed, surgical/procedural consents, equipment checked, pre-op evaluation, timeout performed, anesthesia consent given, oxygen available, monitors applied/VS acknowledged, fire risk safety assessment completed and verbalized and blood product R/B/A discussed and consented  Peripheral Block   Patient position: supine  Prep: ChloraPrep  Provider prep: mask and sterile gloves  Patient monitoring: cardiac monitor, continuous pulse ox, continuous capnometry, frequent blood pressure checks, IV access, oxygen and responsive to questions  Block type: Femoral  Adductor canal  Laterality: right  Injection technique: single-shot  Guidance: ultrasound guided    Needle   Needle type: insulated echogenic nerve stimulator needle   Needle gauge: 20 G  Needle localization: anatomical landmarks and ultrasound guidance  Test dose: negative  Needle length: 10 cm  Assessment   Injection assessment: negative aspiration for heme, no paresthesia on injection, local visualized surrounding nerve on ultrasound, no intravascular symptoms and low pressure verified by pressure monitor  Paresthesia pain: none  Slow fractionated injection: yes  Hemodynamics: stable  Real-time US image taken/store: yes  Outcomes: uncomplicated and patient tolerated procedure well    Medications Administered  ropivacaine (NAROPIN) injection 0.5% - Perineural   30 mL - 7/26/2023 10:55:00 AM

## 2023-07-26 NOTE — ANESTHESIA PRE PROCEDURE
GRANDA      Rhythm: regular  Rate: normal                    Neuro/Psych:   Negative Neuro/Psych ROS              GI/Hepatic/Renal:   (+) renal disease:,           Endo/Other:    (+) : arthritis: OA., .                 Abdominal: normal exam      Abdomen: soft. Vascular: negative vascular ROS. Other Findings:           Anesthesia Plan      general     ASA 2       Induction: inhalational.    MIPS: Postoperative opioids intended and Prophylactic antiemetics administered. Anesthetic plan and risks discussed with patient. Plan discussed with CRNA.     Attending anesthesiologist reviewed and agrees with Preprocedure content      Post-op pain plan if not by surgeon: single peripheral nerve block            Dev Byrd DO   7/26/2023

## 2023-07-26 NOTE — PLAN OF CARE
Problem: Pain  Goal: Verbalizes/displays adequate comfort level or baseline comfort level  Outcome: Progressing   Pt will have adequate pain control post op and will verbalize pain. Problem: Safety - Adult  Goal: Free from fall injury  Outcome: Progressing   Pt will ambulate safely in room with staff.

## 2023-07-26 NOTE — PROGRESS NOTES
0925 Pt arrived from OR, rec'd report from . VSS. Pt's expirations tight,  notified and at bedside. No bleeding noted. Pt c/o of pain, MD aware and medicated pt at bedside.   0935 Pt breathing unlabored. Mother brought to bedside.   0940 Pt tolerating sips of water and ice chips.   1005 Pt sleeping. Appears comfortable. On RA, saturations 94%.  Mom at bedside.   1025  at bedside.   1130 Pt sleeping soundly  1240 Mother expressed readiness for d/c. Discussed d/c instructions with mother and pt. Mother verbalized understanding. Answered all questions. Mother took pt out via wheelchair by RN.    RT at bedside for breathing treatment.

## 2023-07-26 NOTE — PROGRESS NOTES
4 Eyes Skin Assessment     NAME:  Ray Jacob  YOB: 1960  MEDICAL RECORD NUMBER:  [id number]    The patient is being assessed for  Admission    I agree that at least one RN has performed a thorough Head to Toe Skin Assessment on the patient. ALL assessment sites listed below have been assessed. Areas assessed by both nurses:    Head, Face, Ears, Shoulders, Back, Chest, Arms, Elbows, Hands, Sacrum. Buttock, Coccyx, Ischium, Legs. Feet and Heels, and Under Medical Devices         Does the Patient have a Wound?  No noted wound(s)       Zenon Prevention initiated by RN: No  Wound Care Orders initiated by RN: No    Pressure Injury (Stage 3,4, Unstageable, DTI, NWPT, and Complex wounds) if present, place Wound referral order by RN under : No    New Ostomies, if present place, Ostomy referral order under : No     Nurse 1 eSignature: Electronically signed by Jennifer Eli RN on 7/26/23 at 6:45 PM EDT    **SHARE this note so that the co-signing nurse can place an eSignature**    Nurse 2 eSignature: Electronically signed by Angel Mcclure RN on 7/26/23 at 6:52 PM EDT

## 2023-07-26 NOTE — DISCHARGE INSTRUCTIONS
Total Knee Replacement  Discharge Instructions    To prevent Clot formation, you have been placed on the following medication:  81mg aspirin twice daily  Surgical Site Care:  Dressing change every 5-7 days with Mepilex dressing by physical therapy. Showering is permitted after 10 days and only if a waterproof dressing is applied or when sutures are removed and all areas of incision are healed. Physical Therapy:  Weight Bearing Status: weight bearing as tolerated   2 times per week via [facility]  Your first therapy appointment is scheduled  Precautions  Per Physical Therapy Handout  Pain Medications  You were given oxycodone/acetaminophen (Percocet)  Wean off pain medications as you deem appropriate as long as pain is under control  Cold packs/Ice packs/Machine  May be used 3 times daily for 15-30 minutes as necessary  Be sure to have a barrier (cloth, clothing, towel) between the site and the ice pack to prevent frostbite  Contact our office if  Increased redness, swelling, drainage of any kind, and/or pain to surgery site. As well as new onset fevers and or chills. These could signify an infection. Calf or thigh tenderness to touch as well as increased swelling or redness. This could signify a clot formation. Numbness or tingling to an area around the incision site or below the incision site (toes). Any rash appears, increased  or new onset nausea/vomiting occur. This may indicate a reaction to a medication. Phone # [phone] - [address]. Follow up with Surgeon at scheduled appointment time.          Board Certified Orthopaedic Surgeon  President and [facility]

## 2023-07-27 ENCOUNTER — APPOINTMENT (OUTPATIENT)
Dept: GENERAL RADIOLOGY | Age: 63
End: 2023-07-27
Attending: ORTHOPAEDIC SURGERY
Payer: COMMERCIAL

## 2023-07-27 DIAGNOSIS — M25.562 LEFT KNEE PAIN, UNSPECIFIED CHRONICITY: Primary | ICD-10-CM

## 2023-07-27 LAB
ALBUMIN SERPL-MCNC: 3.7 G/DL (ref 3.4–5)
ALBUMIN/GLOB SERPL: 1.3 {RATIO} (ref 1.1–2.2)
ALP SERPL-CCNC: 76 U/L (ref 40–129)
ALT SERPL-CCNC: 14 U/L (ref 10–40)
AMORPH SED URNS QL MICRO: ABNORMAL /HPF
ANION GAP SERPL CALCULATED.3IONS-SCNC: 15 MMOL/L (ref 3–16)
AST SERPL-CCNC: 17 U/L (ref 15–37)
BACTERIA URNS QL MICRO: ABNORMAL /HPF
BASOPHILS # BLD: 0 K/UL (ref 0–0.2)
BASOPHILS NFR BLD: 0.1 %
BILIRUB SERPL-MCNC: <0.2 MG/DL (ref 0–1)
BILIRUB UR QL STRIP.AUTO: NEGATIVE
BUN SERPL-MCNC: 18 MG/DL (ref 7–20)
CALCIUM SERPL-MCNC: 9.7 MG/DL (ref 8.3–10.6)
CHLORIDE SERPL-SCNC: 104 MMOL/L (ref 99–110)
CLARITY UR: CLEAR
CO2 SERPL-SCNC: 19 MMOL/L (ref 21–32)
COLOR UR: YELLOW
CREAT SERPL-MCNC: 0.9 MG/DL (ref 0.6–1.2)
DEPRECATED RDW RBC AUTO: 14.4 % (ref 12.4–15.4)
EKG ATRIAL RATE: 89 BPM
EKG DIAGNOSIS: NORMAL
EKG P AXIS: 52 DEGREES
EKG P-R INTERVAL: 156 MS
EKG Q-T INTERVAL: 340 MS
EKG QRS DURATION: 84 MS
EKG QTC CALCULATION (BAZETT): 413 MS
EKG R AXIS: 54 DEGREES
EKG T AXIS: 8 DEGREES
EKG VENTRICULAR RATE: 89 BPM
EOSINOPHIL # BLD: 0 K/UL (ref 0–0.6)
EOSINOPHIL NFR BLD: 0 %
EPI CELLS #/AREA URNS HPF: ABNORMAL /HPF (ref 0–5)
GFR SERPLBLD CREATININE-BSD FMLA CKD-EPI: >60 ML/MIN/{1.73_M2}
GLUCOSE SERPL-MCNC: 164 MG/DL (ref 70–99)
GLUCOSE UR STRIP.AUTO-MCNC: NEGATIVE MG/DL
HCT VFR BLD AUTO: 35.7 % (ref 36–48)
HCT VFR BLD AUTO: 35.9 % (ref 36–48)
HGB BLD-MCNC: 11.5 G/DL (ref 12–16)
HGB BLD-MCNC: 11.8 G/DL (ref 12–16)
HGB UR QL STRIP.AUTO: NEGATIVE
INR PPP: 1.06 (ref 0.84–1.16)
KETONES UR STRIP.AUTO-MCNC: NEGATIVE MG/DL
LEUKOCYTE ESTERASE UR QL STRIP.AUTO: NEGATIVE
LYMPHOCYTES # BLD: 1.1 K/UL (ref 1–5.1)
LYMPHOCYTES NFR BLD: 10.8 %
MCH RBC QN AUTO: 28.4 PG (ref 26–34)
MCHC RBC AUTO-ENTMCNC: 32.2 G/DL (ref 31–36)
MCV RBC AUTO: 88.3 FL (ref 80–100)
MONOCYTES # BLD: 1 K/UL (ref 0–1.3)
MONOCYTES NFR BLD: 10.5 %
NEUTROPHILS # BLD: 7.6 K/UL (ref 1.7–7.7)
NEUTROPHILS NFR BLD: 78.6 %
NITRITE UR QL STRIP.AUTO: NEGATIVE
PH UR STRIP.AUTO: 7 [PH] (ref 5–8)
PLATELET # BLD AUTO: 235 K/UL (ref 135–450)
PMV BLD AUTO: 8.5 FL (ref 5–10.5)
POTASSIUM SERPL-SCNC: 3.9 MMOL/L (ref 3.5–5.1)
PROT SERPL-MCNC: 6.6 G/DL (ref 6.4–8.2)
PROT UR STRIP.AUTO-MCNC: NEGATIVE MG/DL
PROTHROMBIN TIME: 13.9 SEC (ref 11.5–14.8)
RBC # BLD AUTO: 4.06 M/UL (ref 4–5.2)
RBC #/AREA URNS HPF: ABNORMAL /HPF (ref 0–4)
RENAL EPI CELLS #/AREA UR COMP ASSIST: ABNORMAL /HPF (ref 0–1)
SODIUM SERPL-SCNC: 138 MMOL/L (ref 136–145)
SP GR UR STRIP.AUTO: 1.01 (ref 1–1.03)
TROPONIN, HIGH SENSITIVITY: 13 NG/L (ref 0–14)
TROPONIN, HIGH SENSITIVITY: 13 NG/L (ref 0–14)
UA DIPSTICK W REFLEX MICRO PNL UR: ABNORMAL
URN SPEC COLLECT METH UR: ABNORMAL
UROBILINOGEN UR STRIP-ACNC: 0.2 E.U./DL
WBC # BLD AUTO: 9.7 K/UL (ref 4–11)
WBC #/AREA URNS HPF: ABNORMAL /HPF (ref 0–5)

## 2023-07-27 PROCEDURE — 97116 GAIT TRAINING THERAPY: CPT

## 2023-07-27 PROCEDURE — 93010 ELECTROCARDIOGRAM REPORT: CPT | Performed by: INTERNAL MEDICINE

## 2023-07-27 PROCEDURE — 85025 COMPLETE CBC W/AUTO DIFF WBC: CPT

## 2023-07-27 PROCEDURE — G0378 HOSPITAL OBSERVATION PER HR: HCPCS

## 2023-07-27 PROCEDURE — APPNB30 APP NON BILLABLE TIME 0-30 MINS: Performed by: PHYSICIAN ASSISTANT

## 2023-07-27 PROCEDURE — 2580000003 HC RX 258: Performed by: ORTHOPAEDIC SURGERY

## 2023-07-27 PROCEDURE — 94761 N-INVAS EAR/PLS OXIMETRY MLT: CPT

## 2023-07-27 PROCEDURE — 97530 THERAPEUTIC ACTIVITIES: CPT

## 2023-07-27 PROCEDURE — 93005 ELECTROCARDIOGRAM TRACING: CPT | Performed by: INTERNAL MEDICINE

## 2023-07-27 PROCEDURE — 85018 HEMOGLOBIN: CPT

## 2023-07-27 PROCEDURE — 85610 PROTHROMBIN TIME: CPT

## 2023-07-27 PROCEDURE — 6360000002 HC RX W HCPCS: Performed by: ORTHOPAEDIC SURGERY

## 2023-07-27 PROCEDURE — 81001 URINALYSIS AUTO W/SCOPE: CPT

## 2023-07-27 PROCEDURE — 97165 OT EVAL LOW COMPLEX 30 MIN: CPT

## 2023-07-27 PROCEDURE — 84484 ASSAY OF TROPONIN QUANT: CPT

## 2023-07-27 PROCEDURE — 6360000002 HC RX W HCPCS: Performed by: NURSE PRACTITIONER

## 2023-07-27 PROCEDURE — 2580000003 HC RX 258: Performed by: INTERNAL MEDICINE

## 2023-07-27 PROCEDURE — 85014 HEMATOCRIT: CPT

## 2023-07-27 PROCEDURE — 80053 COMPREHEN METABOLIC PANEL: CPT

## 2023-07-27 PROCEDURE — 96374 THER/PROPH/DIAG INJ IV PUSH: CPT

## 2023-07-27 PROCEDURE — 97161 PT EVAL LOW COMPLEX 20 MIN: CPT

## 2023-07-27 PROCEDURE — 6370000000 HC RX 637 (ALT 250 FOR IP): Performed by: ORTHOPAEDIC SURGERY

## 2023-07-27 PROCEDURE — 97535 SELF CARE MNGMENT TRAINING: CPT

## 2023-07-27 PROCEDURE — 36415 COLL VENOUS BLD VENIPUNCTURE: CPT

## 2023-07-27 PROCEDURE — 94640 AIRWAY INHALATION TREATMENT: CPT

## 2023-07-27 PROCEDURE — 71045 X-RAY EXAM CHEST 1 VIEW: CPT

## 2023-07-27 RX ORDER — SODIUM CHLORIDE 9 MG/ML
INJECTION, SOLUTION INTRAVENOUS CONTINUOUS
Status: DISCONTINUED | OUTPATIENT
Start: 2023-07-27 | End: 2023-07-28 | Stop reason: HOSPADM

## 2023-07-27 RX ORDER — MORPHINE SULFATE 2 MG/ML
2 INJECTION, SOLUTION INTRAMUSCULAR; INTRAVENOUS EVERY 4 HOURS PRN
Status: DISCONTINUED | OUTPATIENT
Start: 2023-07-27 | End: 2023-07-28 | Stop reason: HOSPADM

## 2023-07-27 RX ORDER — ARFORMOTEROL TARTRATE 15 UG/2ML
15 SOLUTION RESPIRATORY (INHALATION)
Status: DISCONTINUED | OUTPATIENT
Start: 2023-07-27 | End: 2023-07-28 | Stop reason: HOSPADM

## 2023-07-27 RX ORDER — BUDESONIDE 0.5 MG/2ML
0.5 INHALANT ORAL
Status: DISCONTINUED | OUTPATIENT
Start: 2023-07-27 | End: 2023-07-28 | Stop reason: HOSPADM

## 2023-07-27 RX ADMIN — ASPIRIN 81 MG: 81 TABLET, COATED ORAL at 08:45

## 2023-07-27 RX ADMIN — ATORVASTATIN CALCIUM 20 MG: 20 TABLET, FILM COATED ORAL at 08:45

## 2023-07-27 RX ADMIN — HYDROMORPHONE HYDROCHLORIDE 0.5 MG: 1 INJECTION, SOLUTION INTRAMUSCULAR; INTRAVENOUS; SUBCUTANEOUS at 03:30

## 2023-07-27 RX ADMIN — MORPHINE SULFATE 2 MG: 2 INJECTION, SOLUTION INTRAMUSCULAR; INTRAVENOUS at 20:06

## 2023-07-27 RX ADMIN — OXYCODONE HYDROCHLORIDE 10 MG: 5 TABLET ORAL at 16:47

## 2023-07-27 RX ADMIN — OXYCODONE HYDROCHLORIDE 10 MG: 5 TABLET ORAL at 12:41

## 2023-07-27 RX ADMIN — SODIUM CHLORIDE, PRESERVATIVE FREE 10 ML: 5 INJECTION INTRAVENOUS at 08:34

## 2023-07-27 RX ADMIN — MORPHINE SULFATE 2 MG: 2 INJECTION, SOLUTION INTRAMUSCULAR; INTRAVENOUS at 17:54

## 2023-07-27 RX ADMIN — AMLODIPINE BESYLATE 10 MG: 10 TABLET ORAL at 08:45

## 2023-07-27 RX ADMIN — OXYCODONE HYDROCHLORIDE 10 MG: 5 TABLET ORAL at 22:00

## 2023-07-27 RX ADMIN — ARFORMOTEROL TARTRATE 15 MCG: 15 SOLUTION RESPIRATORY (INHALATION) at 21:35

## 2023-07-27 RX ADMIN — BUDESONIDE 500 MCG: 0.5 INHALANT RESPIRATORY (INHALATION) at 12:08

## 2023-07-27 RX ADMIN — ONDANSETRON 4 MG: 2 INJECTION INTRAMUSCULAR; INTRAVENOUS at 08:34

## 2023-07-27 RX ADMIN — CEFAZOLIN 2000 MG: 2 INJECTION, POWDER, FOR SOLUTION INTRAMUSCULAR; INTRAVENOUS at 05:30

## 2023-07-27 RX ADMIN — ACETAZOLAMIDE 250 MG: 250 TABLET ORAL at 09:18

## 2023-07-27 RX ADMIN — OXYCODONE HYDROCHLORIDE 10 MG: 5 TABLET ORAL at 08:42

## 2023-07-27 RX ADMIN — SENNOSIDES AND DOCUSATE SODIUM 1 TABLET: 50; 8.6 TABLET ORAL at 20:07

## 2023-07-27 RX ADMIN — ASPIRIN 81 MG: 81 TABLET, COATED ORAL at 20:07

## 2023-07-27 RX ADMIN — OXYCODONE HYDROCHLORIDE 10 MG: 5 TABLET ORAL at 00:00

## 2023-07-27 RX ADMIN — BUDESONIDE 500 MCG: 0.5 INHALANT RESPIRATORY (INHALATION) at 21:35

## 2023-07-27 RX ADMIN — PROCHLORPERAZINE EDISYLATE 10 MG: 5 INJECTION, SOLUTION INTRAMUSCULAR; INTRAVENOUS at 11:47

## 2023-07-27 RX ADMIN — SODIUM CHLORIDE: 9 INJECTION, SOLUTION INTRAVENOUS at 16:03

## 2023-07-27 RX ADMIN — LOSARTAN POTASSIUM 50 MG: 50 TABLET, FILM COATED ORAL at 08:46

## 2023-07-27 RX ADMIN — ARFORMOTEROL TARTRATE 15 MCG: 15 SOLUTION RESPIRATORY (INHALATION) at 12:07

## 2023-07-27 RX ADMIN — SENNOSIDES AND DOCUSATE SODIUM 1 TABLET: 50; 8.6 TABLET ORAL at 08:45

## 2023-07-27 RX ADMIN — POTASSIUM CHLORIDE 20 MEQ: 1500 TABLET, EXTENDED RELEASE ORAL at 08:45

## 2023-07-27 RX ADMIN — GABAPENTIN 200 MG: 100 CAPSULE ORAL at 08:45

## 2023-07-27 ASSESSMENT — PAIN DESCRIPTION - LOCATION
LOCATION: LEG
LOCATION: HIP;LEG;KNEE
LOCATION: LEG;HIP
LOCATION: HIP;KNEE;LEG
LOCATION: LEG;KNEE
LOCATION: LEG;KNEE;HIP
LOCATION: KNEE
LOCATION: KNEE
LOCATION: HIP
LOCATION: LEG

## 2023-07-27 ASSESSMENT — PAIN - FUNCTIONAL ASSESSMENT
PAIN_FUNCTIONAL_ASSESSMENT: PREVENTS OR INTERFERES SOME ACTIVE ACTIVITIES AND ADLS
PAIN_FUNCTIONAL_ASSESSMENT: ACTIVITIES ARE NOT PREVENTED
PAIN_FUNCTIONAL_ASSESSMENT: PREVENTS OR INTERFERES WITH MANY ACTIVE NOT PASSIVE ACTIVITIES
PAIN_FUNCTIONAL_ASSESSMENT: PREVENTS OR INTERFERES WITH MANY ACTIVE NOT PASSIVE ACTIVITIES
PAIN_FUNCTIONAL_ASSESSMENT: ACTIVITIES ARE NOT PREVENTED
PAIN_FUNCTIONAL_ASSESSMENT: PREVENTS OR INTERFERES SOME ACTIVE ACTIVITIES AND ADLS
PAIN_FUNCTIONAL_ASSESSMENT: ACTIVITIES ARE NOT PREVENTED

## 2023-07-27 ASSESSMENT — PAIN DESCRIPTION - FREQUENCY
FREQUENCY: CONTINUOUS

## 2023-07-27 ASSESSMENT — PAIN DESCRIPTION - ORIENTATION
ORIENTATION: RIGHT

## 2023-07-27 ASSESSMENT — PAIN DESCRIPTION - PAIN TYPE
TYPE: SURGICAL PAIN

## 2023-07-27 ASSESSMENT — PAIN SCALES - GENERAL
PAINLEVEL_OUTOF10: 10
PAINLEVEL_OUTOF10: 9
PAINLEVEL_OUTOF10: 0
PAINLEVEL_OUTOF10: 3
PAINLEVEL_OUTOF10: 8
PAINLEVEL_OUTOF10: 8
PAINLEVEL_OUTOF10: 5
PAINLEVEL_OUTOF10: 2
PAINLEVEL_OUTOF10: 6
PAINLEVEL_OUTOF10: 10
PAINLEVEL_OUTOF10: 2
PAINLEVEL_OUTOF10: 6
PAINLEVEL_OUTOF10: 3
PAINLEVEL_OUTOF10: 8
PAINLEVEL_OUTOF10: 10
PAINLEVEL_OUTOF10: 10
PAINLEVEL_OUTOF10: 7

## 2023-07-27 ASSESSMENT — PAIN DESCRIPTION - ONSET
ONSET: ON-GOING
ONSET: AWAKENED FROM SLEEP
ONSET: ON-GOING
ONSET: ON-GOING

## 2023-07-27 ASSESSMENT — PAIN DESCRIPTION - DESCRIPTORS
DESCRIPTORS: ACHING;PRESSURE
DESCRIPTORS: ACHING
DESCRIPTORS: ACHING
DESCRIPTORS: DISCOMFORT
DESCRIPTORS: DISCOMFORT
DESCRIPTORS: PRESSURE
DESCRIPTORS: PRESSURE
DESCRIPTORS: STABBING
DESCRIPTORS: STABBING
DESCRIPTORS: ACHING;DISCOMFORT

## 2023-07-27 NOTE — OP NOTE
[address] [facility]               [address]                                OPERATIVE REPORT    PATIENT NAME: Landon Simpson              :        1960  MED REC NO:   [id number]                          ROOM:       [facility]  ACCOUNT NO:   [de-identified]                           ADMIT DATE: 2023  PROVIDER:     Diamond Padron MD    DATE OF PROCEDURE:  2023    PREOPERATIVE DIAGNOSIS:  Tricompartmental osteoarthritis with valgus  malalignment, right knee. POSTOPERATIVE DIAGNOSIS:  Tricompartmental osteoarthritis with valgus  malalignment, right knee. OPERATIVE PROCEDURE:  Right total knee joint replacement with Joellen Kale travelmobney II Visionaire system #4 femur, #3 tibia, 9-mm  polyethylene tibial insert, 32 x 7 mm patellar insert with mini Z-plasty  lateral release. SURGEON:  Diamond Padron MD    FIRST ASSISTANT:  CRAIG Juarez    SECOND ASSISTANT:  Celia Roque MD    ANESTHESIA:  General.    OPERATIVE INDICATIONS:  This patient understood the associated risks,  benefits and consented to the operative procedure, having advancing  symptoms that affected all activities of daily living. She underwent  the entire educational process and provided the risk-benefit analysis. The procedure went well up to the very damaged knee with severe bone  loss to the patella, lateral facet, lateral tibial plateau. Visionaire  system provided an excellent representation anatomically and avoided an  intramedullary femoral darwin. Excellent ligament balancing, restoring  full motion from 0 to 135 degrees flexion. A physician assistant was  present throughout the operative procedure and assisted in limb  positioning and the operative procedure. DESCRIPTION OF PROCEDURE:  This patient was placed in supine position  upon the operating room table.   After satisfactory level of general  anesthesia, the right knee was prepped and draped to

## 2023-07-27 NOTE — PROGRESS NOTES
follow. Treatment Diagnosis: Decreased functional mobility  Therapy Prognosis: Excellent  Decision Making: Low Complexity  Barriers to Learning: none  Requires PT Follow-Up: Yes  Activity Tolerance  Activity Tolerance: Patient tolerated evaluation without incident;Patient tolerated treatment well     Plan   Physcial Therapy Plan  General Plan: 2 times a day 7 days a week  Current Treatment Recommendations: Strengthening, Balance training, Functional mobility training, Transfer training, Gait training, Stair training, Therapeutic activities, Safety education & training, Endurance training  Safety Devices  Type of Devices: Gait belt, Left in chair, Chair alarm in place, Call light within reach, Nurse notified     Restrictions  Position Activity Restriction  Other position/activity restrictions: Up with assist, FWBAT     Subjective   General  Chart Reviewed: Yes  Additional Pertinent Hx: 58 y/o pt s/p RIGHT TOTAL KNEE REPLACEMENT. Family / Caregiver Present: No  Referring Practitioner: Bryon Grant MD  Diagnosis: s/p RIGHT TOTAL KNEE REPLACEMENT  Follows Commands: Within Functional Limits  Subjective  Subjective: Pt found supine in bed upon arrival, reporting 3/10 pain and agreeable to therapy.          Social/Functional History  Social/Functional History  Lives With: Spouse ( and 2 roomates)  Type of Home: House  Home Layout: Two level, Bed/Bath upstairs, 1/2 bath on main level (14 steps up to bedroom with handrail on the left side)  Home Access: Stairs to enter with rails  Entrance Stairs - Number of Steps: 3 sofiya w/ handrail  Entrance Stairs - Rails: Both  Bathroom Shower/Tub: Walk-in shower  Bathroom Toilet: Bedside commode  Home Equipment: Walker, rolling  Has the patient had two or more falls in the past year or any fall with injury in the past year?: No  ADL Assistance: Independent  Homemaking Assistance: Needs assistance (family helps with laundry and cleaning)  Ambulation Assistance: Independent  Transfer Assistance: Independent  Active : Yes  Occupation: Self employed  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  taking time off to provide initial 24hr A  Vision/Hearing  Vision  Vision: Impaired  Vision Exceptions: Wears glasses for reading  Hearing  Hearing: Within functional limits    Cognition   Orientation  Overall Orientation Status: Within Normal Limits  Orientation Level: Oriented X4     Objective   Pulse: 88  Heart Rate Source: Monitor  BP: (!) 155/71  BP Location: Right upper arm  BP Method: Automatic  Patient Position: Semi fowlers  MAP (Calculated): 99  Respirations: 16  SpO2: 97 %  O2 Device: None (Room air)              AROM RLE (degrees)  RLE General AROM: R Knee: 10-50 deg  AROM LLE (degrees)  LLE AROM : WFL  Strength RLE  Strength RLE: WFL  Strength LLE  Strength LLE: WFL           Bed mobility  Supine to Sit: Stand by assistance (via log roll)  Scooting: Stand by assistance  Transfers  Sit to Stand: Contact guard assistance  Stand to Sit: Contact guard assistance  Ambulation  WB Status: FWBAT  Ambulation  Surface: Level tile  Device: Rolling Walker  Assistance: Contact guard assistance  Quality of Gait: slow and steady, step-to gait. Overall steady with no LOB or near LOB. Distance: 10'+175'+5'  Stairs/Curb  Stairs?: Yes  Stairs  # Steps : 4  Stairs Height: 6\"  Rails: Right ascending  Device: No Device  Assistance: Contact guard assistance  Comment: Pt using both hands on R HR and ascending/descending sideways. After completing the stairs pt with N/V. RN notified and compazine provided. Balance  Posture: Good  Sitting - Static: Good  Sitting - Dynamic: Good  Standing - Static: Good  Standing - Dynamic: Good         2nd Session:   Pt found supine in bed upon arrival, reporting 7/10 pain (RN aware) and agreeable to therapy. Pt SBA for sup<>sit transfers at start and end of session.  Pt CGA for transfers and to

## 2023-07-27 NOTE — PROGRESS NOTES
Pt Alert and Oriented x4, follow commands, afebrile. VSS on Room Air. Pain medication administered as per MAR; PRN Oxycodone and Dilaudid was given. Non pharmacological measures applied too PT had 3 episodes of vomiting; informed to MD and PRN Compazine was given as per [facility]. Pt on regular diet. Pt up to the toilet; standby assist with GB walker, no bowel movements during shift period. RT leg elevated and ice therapy done. Bed in low position,bed alarm on, call light within reach and no fall during this shift.

## 2023-07-27 NOTE — PLAN OF CARE
Problem: Pain  Goal: Verbalizes/displays adequate comfort level or baseline comfort level  7/26/2023 2352 by Eliz Rooney RN  Outcome: Progressing  Numeric pain rating scale used. PRN, Dilaudid and oxycodone given as per [facility] and non pharmacological measures applied too. Timely assessment, intervention done. Problem: Safety - Adult  Goal: Free from fall injury  7/26/2023 2352 by Eliz Rooney RN  Outcome: Progressing  All needed standard safety precautions in place. No fall during this shift.    Flowsheets (Taken 7/26/2023 2350)  Free From Fall Injury: Instruct family/caregiver on patient safety     Problem: ABCDS Injury Assessment  Goal: Absence of physical injury  Outcome: Progressing     Problem: Discharge Planning  Goal: Discharge to home or other facility with appropriate resources  Outcome: Progressing  Flowsheets  Taken 7/26/2023 2300  Discharge to home or other facility with appropriate resources:   Identify barriers to discharge with patient and caregiver   Arrange for needed discharge resources and transportation as appropriate  Taken 7/26/2023 1900  Discharge to home or other facility with appropriate resources:   Identify barriers to discharge with patient and caregiver   Arrange for needed discharge resources and transportation as appropriate

## 2023-07-27 NOTE — PROGRESS NOTES
Assisted the pt to the bathroom this morning, pt tolerated well, but complained of pain and nausea. Medicated the pt per Md orders (see MAR). Pt wanted to go to bed and lay down for awhile (she was up in the chair this morning). Pt assisted to bed, bed alarm on. Pt has no other needs at this time, call light in reach and bed alarm on. I will continue to follow.  Golden Baez RN

## 2023-07-27 NOTE — PROGRESS NOTES
Pt completed clothing mgmt at hips in stance w/ CGA for standing balance  Toileting: Contact guard assistance  Toileting Skilled Clinical Factors: pt urinated seated on toilet.  Pt completed clothing mgmt in stance at hips w/ CGA     Activity Tolerance  Activity Tolerance: Patient tolerated evaluation without incident;Patient tolerated treatment well  Bed mobility  Supine to Sit: Stand by assistance (via log roll)  Scooting: Stand by assistance  Transfers  Sit to stand: Contact guard assistance  Stand to sit: Contact guard assistance  Vision  Vision: Impaired  Vision Exceptions: Wears glasses for reading  Hearing  Hearing: Within functional limits  Cognition  Overall Cognitive Status: WNL  Orientation  Overall Orientation Status: Within Normal Limits  Orientation Level: Oriented X4                  Education Given To: Patient  Education Provided: Role of Therapy;Plan of Care;ADL Adaptive Strategies;Transfer Training  Education Method: Demonstration;Verbal  Barriers to Learning: None  Education Outcome: Verbalized understanding;Continued education needed           Hand Dominance  Hand Dominance: Right            G-Code     OutComes Score                                                  AM-PAC Score        AM-[facility] Inpatient Daily Activity Raw Score: 18 (07/27/23 1437)  AM-PAC Inpatient ADL T-Scale Score : 38.66 (07/27/23 1437)  ADL Inpatient CMS 0-100% Score: 46.65 (07/27/23 1437)  ADL Inpatient CMS G-Code Modifier : CK (07/27/23 1437)    Tinneti Score       Goals  Short Term Goals  Time Frame for Short Term Goals: by dc  Short Term Goal 1: Pt will complete LE dressing w/ Mod I and use of AE prn  Short Term Goal 2: Pt will complete toileting w/ Mod I  Short Term Goal 3: Pt will complete functional transfers w/ Mod I       Therapy Time   Individual Concurrent Group Co-treatment   Time In 1051         Time Out 1159         Minutes 68         Timed Code Treatment Minutes: 53 Minutes (+15min eval)       Maria L de leon OT

## 2023-07-27 NOTE — PROGRESS NOTES
Pt is alert and oriented x4. Pt has had pain today which has limited her being out of bed as much. Pt sat in chair this morning and has been ambulating to the bathroom. Tmax was 100.5, IV fluids ordered to hydrate and UA ordered. Pt has required PRN medication for nausea today. Plan is to discharge home tomorrow.

## 2023-07-27 NOTE — PROGRESS NOTES
12 lead EKG done and in chart, MD notified via perfect serve. Pt resting in bed, and currently denies any needs at this time. I will continue to follow.  Bonifacio Bronson RN

## 2023-07-27 NOTE — PROGRESS NOTES
While working with PT/OT pt started to dry heave. PRN Compazine given per MD order. I will continue to follow.  Pamela Mendez RN

## 2023-07-27 NOTE — CARE COORDINATION
Case Management Assessment/ Note  Date/ Time of Note: 7/27/2023 3:57 PM  Note completed by: Bharati Oscar RN    If patient is discharged prior to next notation, then this note serves as note for discharge by case management. Patient Name: Diane Wiggins  YOB: 1960    Diagnosis:Osteoarthritis of right knee, unspecified osteoarthritis type [M17.11]  S/P knee surgery [Z98.890]  S/P total knee arthroplasty, right [Z96.651]  Patient Admission Status: Observation  Date of Admission:7/26/2023  9:42 AM    Length of Stay: 0 GLOS:   Readmission Risk Score:      ________________________________________________________________________________________  PT AM-PAC: 18 / 24 per last evaluation on: 7/27    OT AM-PAC: 18 / 24 per last evaluation on: 7/27    DME Needs for discharge: YES; Shower Chair  DME Ordered: Yes DME Delivered: no  DME Company: [employer]  ________________________________________________________________________________________  Discharge Plan: Home with Outpatient Therapies  Pre-cert required for SNF: Not Applicable  COVID Result:  No results found for: [address] [place] for discharge: family    Tentative discharge date: tbd    Potential assistance Purchasing Medications:    Does Patient want to participate in local refill/ meds to beds program?: Yes    Current barriers to discharge: Medical complications      ________________________________________________________________________________________  Case Management Notes: Patient is from home with spouse, he plans to take time off of work to provide initial 24h assistance. Patient admitted with Osteoarthritis of right knee, unspecified osteoarthritis type [M17.11]  S/P knee surgery [Z98.890]  S/P total knee arthroplasty, right [Z96.651]  POD1. Plan for additional pt/ot session prior to dc. DME ordered; will need to be delivered.    CM will follow for dc planning and support        Neelima Kruse and her family were provided with choice

## 2023-07-27 NOTE — PROGRESS NOTES
Messaged the on call Ortho NP about break through pain. The pt currently has IV Dilaudid ordered but she doesn't like to take it because it makes her nauseous. The pt was asking for something different. I will continue to follow.  Hernan Damico RN

## 2023-07-27 NOTE — PLAN OF CARE
Problem: Discharge Planning  Goal: Discharge to home or other facility with appropriate resources  7/27/2023 0955 by Kaye Burk RN  Outcome: Progressing  Flowsheets  Taken 7/27/2023 0710 by Windsor Sicard, RN  Discharge to home or other facility with appropriate resources: Identify barriers to discharge with patient and caregiver  Taken 7/27/2023 0300 by Farida Figueroa RN  Discharge to home or other facility with appropriate resources:   Identify barriers to discharge with patient and caregiver   Arrange for needed discharge resources and transportation as appropriate  7/26/2023 2352 by Farida Figueroa RN  Outcome: Progressing  Flowsheets  Taken 7/26/2023 2300  Discharge to home or other facility with appropriate resources:   Identify barriers to discharge with patient and caregiver   Arrange for needed discharge resources and transportation as appropriate  Taken 7/26/2023 1900  Discharge to home or other facility with appropriate resources:   Identify barriers to discharge with patient and caregiver   Arrange for needed discharge resources and transportation as appropriate     Problem: Pain  Goal: Verbalizes/displays adequate comfort level or baseline comfort level  7/27/2023 0955 by Kaye Burk RN  Outcome: Progressing  7/26/2023 2352 by Farida Figueroa RN  Outcome: Progressing     Problem: Safety - Adult  Goal: Free from fall injury  7/27/2023 0955 by Kaye Burk RN  Outcome: Progressing  7/26/2023 2352 by Farida Figueroa RN  Outcome: Progressing  Flowsheets (Taken 7/26/2023 2350)  Free From Fall Injury: Instruct family/caregiver on patient safety     Problem: ABCDS Injury Assessment  Goal: Absence of physical injury  7/27/2023 0955 by Kaye Burk RN  Outcome: Progressing  7/26/2023 2352 by Farida iFgueroa RN  Outcome: Progressing

## 2023-07-28 ENCOUNTER — APPOINTMENT (OUTPATIENT)
Dept: PHYSICAL THERAPY | Age: 63
End: 2023-07-28
Payer: COMMERCIAL

## 2023-07-28 ENCOUNTER — HOSPITAL ENCOUNTER (OUTPATIENT)
Dept: PHYSICAL THERAPY | Age: 63
Setting detail: THERAPIES SERIES
Discharge: HOME | End: 2023-07-28
Payer: COMMERCIAL

## 2023-07-28 VITALS
DIASTOLIC BLOOD PRESSURE: 77 MMHG | HEIGHT: 62 IN | TEMPERATURE: 98.9 F | HEART RATE: 87 BPM | OXYGEN SATURATION: 93 % | BODY MASS INDEX: 33.38 KG/M2 | WEIGHT: 181.4 LBS | RESPIRATION RATE: 18 BRPM | SYSTOLIC BLOOD PRESSURE: 150 MMHG

## 2023-07-28 PROCEDURE — 2580000003 HC RX 258: Performed by: ORTHOPAEDIC SURGERY

## 2023-07-28 PROCEDURE — 6370000000 HC RX 637 (ALT 250 FOR IP): Performed by: ORTHOPAEDIC SURGERY

## 2023-07-28 PROCEDURE — 99223 1ST HOSP IP/OBS HIGH 75: CPT | Performed by: INTERNAL MEDICINE

## 2023-07-28 PROCEDURE — 6360000002 HC RX W HCPCS: Performed by: ORTHOPAEDIC SURGERY

## 2023-07-28 PROCEDURE — 96376 TX/PRO/DX INJ SAME DRUG ADON: CPT

## 2023-07-28 PROCEDURE — 97530 THERAPEUTIC ACTIVITIES: CPT

## 2023-07-28 PROCEDURE — 97535 SELF CARE MNGMENT TRAINING: CPT

## 2023-07-28 PROCEDURE — 97116 GAIT TRAINING THERAPY: CPT

## 2023-07-28 PROCEDURE — G0378 HOSPITAL OBSERVATION PER HR: HCPCS

## 2023-07-28 PROCEDURE — 6360000002 HC RX W HCPCS: Performed by: NURSE PRACTITIONER

## 2023-07-28 PROCEDURE — 94640 AIRWAY INHALATION TREATMENT: CPT

## 2023-07-28 RX ORDER — OXYCODONE HYDROCHLORIDE 10 MG/1
10 TABLET ORAL EVERY 4 HOURS PRN
Qty: 30 TABLET | Refills: 0 | Status: SHIPPED | OUTPATIENT
Start: 2023-07-28 | End: 2023-08-02

## 2023-07-28 RX ADMIN — MORPHINE SULFATE 2 MG: 2 INJECTION, SOLUTION INTRAMUSCULAR; INTRAVENOUS at 04:03

## 2023-07-28 RX ADMIN — MORPHINE SULFATE 2 MG: 2 INJECTION, SOLUTION INTRAMUSCULAR; INTRAVENOUS at 08:35

## 2023-07-28 RX ADMIN — POTASSIUM CHLORIDE 20 MEQ: 1500 TABLET, EXTENDED RELEASE ORAL at 08:36

## 2023-07-28 RX ADMIN — ATORVASTATIN CALCIUM 20 MG: 20 TABLET, FILM COATED ORAL at 08:37

## 2023-07-28 RX ADMIN — ARFORMOTEROL TARTRATE 15 MCG: 15 SOLUTION RESPIRATORY (INHALATION) at 07:55

## 2023-07-28 RX ADMIN — BUDESONIDE 500 MCG: 0.5 INHALANT RESPIRATORY (INHALATION) at 07:55

## 2023-07-28 RX ADMIN — SENNOSIDES AND DOCUSATE SODIUM 1 TABLET: 50; 8.6 TABLET ORAL at 08:36

## 2023-07-28 RX ADMIN — ASPIRIN 81 MG: 81 TABLET, COATED ORAL at 08:36

## 2023-07-28 RX ADMIN — OXYCODONE HYDROCHLORIDE 10 MG: 5 TABLET ORAL at 10:07

## 2023-07-28 RX ADMIN — OXYCODONE HYDROCHLORIDE 10 MG: 5 TABLET ORAL at 06:08

## 2023-07-28 RX ADMIN — MORPHINE SULFATE 2 MG: 2 INJECTION, SOLUTION INTRAMUSCULAR; INTRAVENOUS at 12:19

## 2023-07-28 RX ADMIN — SODIUM CHLORIDE, PRESERVATIVE FREE 10 ML: 5 INJECTION INTRAVENOUS at 08:38

## 2023-07-28 RX ADMIN — MORPHINE SULFATE 2 MG: 2 INJECTION, SOLUTION INTRAMUSCULAR; INTRAVENOUS at 00:08

## 2023-07-28 RX ADMIN — OXYCODONE HYDROCHLORIDE 10 MG: 5 TABLET ORAL at 14:29

## 2023-07-28 RX ADMIN — OXYCODONE HYDROCHLORIDE 10 MG: 5 TABLET ORAL at 02:11

## 2023-07-28 RX ADMIN — GABAPENTIN 200 MG: 100 CAPSULE ORAL at 08:36

## 2023-07-28 RX ADMIN — AMLODIPINE BESYLATE 10 MG: 10 TABLET ORAL at 08:37

## 2023-07-28 RX ADMIN — LOSARTAN POTASSIUM 50 MG: 50 TABLET, FILM COATED ORAL at 08:38

## 2023-07-28 RX ADMIN — ACETAZOLAMIDE 250 MG: 250 TABLET ORAL at 08:37

## 2023-07-28 ASSESSMENT — PAIN DESCRIPTION - FREQUENCY
FREQUENCY: CONTINUOUS

## 2023-07-28 ASSESSMENT — PAIN DESCRIPTION - ONSET
ONSET: ON-GOING
ONSET: GRADUAL
ONSET: ON-GOING

## 2023-07-28 ASSESSMENT — PAIN DESCRIPTION - PAIN TYPE
TYPE: SURGICAL PAIN

## 2023-07-28 ASSESSMENT — PAIN - FUNCTIONAL ASSESSMENT

## 2023-07-28 ASSESSMENT — PAIN SCALES - GENERAL
PAINLEVEL_OUTOF10: 7
PAINLEVEL_OUTOF10: 6
PAINLEVEL_OUTOF10: 4
PAINLEVEL_OUTOF10: 4
PAINLEVEL_OUTOF10: 6
PAINLEVEL_OUTOF10: 10
PAINLEVEL_OUTOF10: 6
PAINLEVEL_OUTOF10: 7
PAINLEVEL_OUTOF10: 3
PAINLEVEL_OUTOF10: 6
PAINLEVEL_OUTOF10: 5
PAINLEVEL_OUTOF10: 3
PAINLEVEL_OUTOF10: 4
PAINLEVEL_OUTOF10: 3

## 2023-07-28 ASSESSMENT — PAIN DESCRIPTION - ORIENTATION
ORIENTATION: RIGHT
ORIENTATION: LEFT
ORIENTATION: RIGHT

## 2023-07-28 ASSESSMENT — PAIN DESCRIPTION - LOCATION
LOCATION: KNEE

## 2023-07-28 ASSESSMENT — PAIN DESCRIPTION - DESCRIPTORS
DESCRIPTORS: CRUSHING
DESCRIPTORS: ACHING
DESCRIPTORS: CRUSHING
DESCRIPTORS: ACHING
DESCRIPTORS: SHARP
DESCRIPTORS: ACHING
DESCRIPTORS: ACHING

## 2023-07-28 NOTE — CONSULTS
[address]  Cardiology Inpatient Consult Service                                                                                          Pt Name: Alaina Augustin  Age: 59 y.o. Sex: female  : 1960  Location: [facility]5506-01    Referring Physician: Hunter Montejo MD      Reason for Consult:       Reason for Consultation/Chief Complaint: chest pain, abnormal Q waves in inferior leads      HPI:      Alaina Augustin is a 59 y.o. female with a past medical history of asthma, FSGS, OA, HTN, HLD  who presented to the hospital with R total knee replacement for osteoarthritis of R knee. Surgery done on . Medicine was consulted the following day due persisten n/v, unable to sabrina ate PO also dull squeezing chest pain. Pt has EKG changes, Q waves in inferior leads, poor R wave progression V2 and V3. Troponin 13 at 1pm and 13 at 6:25am. CXR with no acute disease. Pt states that she had around 20 episodes of vomiting with nausea after her surgery of Wednesday. She state that he was having chest tightness and pressure-like pain across her chest. This resolved the nest afternoon when she got her inhalers. Histories     Past Medical History:   has a past medical history of Asthma, Chronic back pain, Histoplasmosis, and Kidney disease. Surgical History:   has a past surgical history that includes shoulder surgery; Bunionectomy; back surgery; other surgical history; and Total knee arthroplasty (Right, 2023). Social History:   reports that she has quit smoking. She has never used smokeless tobacco. She reports current alcohol use. She reports that she does not currently use drugs. Family History:  No evidence for sudden cardiac death or premature CAD      Medications:       Home Medications  Were reviewed and are listed in nursing record. and/or listed below  Prior to Admission medications    Medication Sig Start Date End Date Taking?  Authorizing Provider playing pickle ball and swimming at the pool multiple times per week, and she has not had any chest pain during that time  - no further cardiac testing at this time  - pt may follow-up as an outpatient if she had chest pain again especially if exertional,  pt aware and in agreement of plan    Tobacco use was discussed with the patient and educated on the negative effects. I have asked the patient to not utilize these agents. Thank you for allowing to us to participate in the care or Landy Whitten. Further evaluation will be based upon the patient's clinical course and testing results. Jaqueline Liz MD, PGY-3    Original note by resident/student was edited based on my personal history and exam of the patient. I have personally reviewed the reports and images of labs, radiological studies, cardiac studies including ECG's and telemetry, current and old medical records. The note was completed using EMR. Every effort was made to ensure accuracy; however, inadvertent computerized transcription errors may be present. Patient is 42-year-old woman with history of HTN, HLP, osteoarthritis. Patient was admitted on 7/26 for elective right TKA. Procedure was uncomplicated. Postoperatively however she developed significant nausea with vomiting on and off, inability to tolerate any diet, along with some midsternal chest discomfort (after starting GI symptoms). Patient has been on narcotic pain medications for her recent surgery. Cardiology was consulted for possible ACS. Troponin x2 normal (13x2) on 7/27/2023    ECG 7/27/2023 normal    Hemoglobin 11 down from 13. Chest x-ray unremarkable. Patient today reports improvement with her nausea and vomiting and denies any chest pains. Apparently patient has been very active prior to her knee surgery and recently played pickle ball and went swimming. She did not have any ischemic or congestive symptoms. Assessment and plan:  1.   Atypical

## 2023-07-28 NOTE — PROGRESS NOTES
Patient A/Ox4. VSS on RA. Pt c/o continuous  pain to knee this shift. Being managed w/ MAR & PRN medications. Ambulation CGA x1 with walker. Voiding well via BRP. Pt tolerating PO fluids and diet well, but reduced in take. Skin CDI with exception of surgical dressing in place. Patient pleasant but seems discouraged by amount of pain she is is. All standard fall and safety precautions in place. No acute changes this shift, will continue to monitor.

## 2023-07-28 NOTE — PLAN OF CARE
Patient is being discharged, seen by cardiology and cleared. Will sign off. Discussed with RN please call with ?

## 2023-07-28 NOTE — PLAN OF CARE
Problem: Discharge Planning  Goal: Discharge to home or other facility with appropriate resources  7/28/2023 1306 by Oleg Conner RN  Outcome: Progressing  Note: Patient will discharge home with . Social work following for needs. Problem: Pain  Goal: Verbalizes/displays adequate comfort level or baseline comfort level  7/28/2023 1306 by Oleg Conner RN  Outcome: Progressing  Note: Endorsing surgical pain to R knee. Using numerical pain rating scale appropriately. PRN oxy and morphine administered per the [facility]. Patient educated on medications, side effects, and verbalized understanding. Assisted with turning and repositioning in bed for comfort/pressure relief. Ice packs used for non-pharmacological pain control. Notified of pain medication administration schedule. Plan of care continues. Problem: Safety - Adult  Goal: Free from fall injury  7/28/2023 1306 by Oleg Conner RN  Outcome: Progressing  Flowsheets (Taken 7/28/2023 0050 by Laurel Connor RN)  Free From Fall Injury: Instruct family/caregiver on patient safety  Note: Fall precautions in place. Non-skid socks on. Bed locked in lowest position with alarm on and side rails up. Bedside table, belongings, and call light within reach. Patient calling out appropriately when needing assistance. Hourly rounding in anticipation of patient needs. Floor clean and free from clutter. Room door open. Plan of care continues.

## 2023-07-28 NOTE — FLOWSHEET NOTE
[facility] and [address] [place] [address] [address],  [facility], [address] [place] [address], [address] [place] [address] Avenue  Phone: [phone]  Fax: [phone]      Physical Therapy  Cancellation/No-show Note  Patient Name:  Tammy Sullivan  :  1960   Date:  2023  Cancelled visits to date: 1  No-shows to date: 0    For today's appointment patient:  [x]  Cancelled  []  Rescheduled appointment  []  No-show     Reason given by patient:  []  Patient ill  []  Conflicting appointment   []  No transportation    []  Conflict with work  []  No reason given  [x]  Other:     Comments:  Pt has not yet been discharged from the hospital. Will f/u with OP PT next week.     Electronically signed by:  Cruz Clements PT

## 2023-07-28 NOTE — PROGRESS NOTES
Physical Therapy  Facility/Department: [address] [place] [address]   Physical Therapy Treatment    Name: Katherine Eason  : 1960  MRN: [id number]  Date of Service: 2023    Discharge Recommendations:    Katherine Eason scored a 18/24 on the AM-PAC short mobility form. Current research shows that an AM-PAC score of 18 or greater is typically associated with a discharge to the patient's home setting. Based on the patient's AM-PAC score and their current functional mobility deficits, it is recommended that the patient have 2-3 sessions per week of Physical Therapy at d/c to increase the patient's independence. At this time, this patient demonstrates the endurance and safety to discharge home with 24hr A (home vs OP services) and a follow up treatment frequency of 2-3x/wk. Please see assessment section for further patient specific details. If patient discharges prior to next session this note will serve as a discharge summary. Please see below for the latest assessment towards goals. PT Equipment Recommendations  Equipment Needed: No (pts owns RW)      Patient Diagnosis(es): The encounter diagnosis was S/P knee surgery. Past Medical History:  has a past medical history of Asthma, Chronic back pain, Histoplasmosis, and Kidney disease. Past Surgical History:  has a past surgical history that includes shoulder surgery; Bunionectomy; back surgery; other surgical history; and Total knee arthroplasty (Right, 2023). Assessment   Body Structures, Functions, Activity Limitations Requiring Skilled Therapeutic Intervention: Decreased functional mobility   Assessment: Pt with improved functional mobility and endurance this session with improved pain control and no nausea. Pt SBA for bed mobility and CGA for transfers, amb with RW and stair negotiation with HR. Pt planning to d/c home with 24hr A from . Will continue to follow.   Treatment Diagnosis: Decreased functional mobility  Barriers to

## 2023-07-28 NOTE — PLAN OF CARE
Problem: Pain  Goal: Verbalizes/displays adequate comfort level or baseline comfort level  7/28/2023 0045 by Femi Maradiaga RN  Outcome: Progressing  Pt endorsing pain to knee. Being treated with PRN pain medication, rest, and frequent repositioning with pillow support for comfort and pressure relief. Pt reports some relief from pain with above interventions. Problem: Safety - Adult  Goal: Free from fall injury  Outcome: Progressing   All fall precautions in place. Bed locked and in lowest position with alarm on. Overbed table and personal belonings within reach. Call light within reach and patient instructed to use call light for assistance. Non-skid socks on.

## 2023-07-28 NOTE — PROGRESS NOTES
ADL, Home management training, Equipment evaluation, education, & procurement     Restrictions  Position Activity Restriction  Other position/activity restrictions: Up with assist, FWBAT    Subjective   General  Chart Reviewed: Yes  Patient assessed for rehabilitation services?: Yes  Additional Pertinent Hx: s/p RIGHT TOTAL KNEE REPLACEMENT with Dr. Misa Wallace (7/26)  Family / Caregiver Present: Yes ()  Referring Practitioner: Eva Adams MD  Diagnosis: Osteoarthritis of right knee  Subjective  Subjective: Pt in chair, reports level 5 R knee pain, able to have pain meds arouns 12:00 per pt report. Pt agreeable to work with OT. Social/Functional History  Social/Functional History  Lives With: Spouse ( and 2 roomates)  Type of Home: House  Home Layout: Two level, Bed/Bath upstairs, 1/2 bath on main level (14 steps up to bedroom with handrail on the left side)  Home Access: Stairs to enter with rails  Entrance Stairs - Number of Steps: 3 sofiya w/ handrail  Entrance Stairs - Rails: Both  Bathroom Shower/Tub: Walk-in shower  Bathroom Toilet: Bedside commode  Home Equipment: Walker, rolling  Has the patient had two or more falls in the past year or any fall with injury in the past year?: No  ADL Assistance: Independent  Homemaking Assistance: Needs assistance (family helps with laundry and cleaning)  Ambulation Assistance: Independent  Transfer Assistance: Independent  Active : Yes  Occupation: Self employed  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  taking time off to provide initial 24hr A       Objective                Safety Devices  Type of Devices: Call light within reach; Left in chair;Nurse notified; Chair alarm in place ( present)     Toilet Transfers  Toilet - Technique: Ambulating (with rolling walker, CGA, to/from bathroom)  Equipment Used: Standard toilet (grab bar)  Toilet Transfer: Contact guard assistance     ADL  Grooming: Independent (washing hands in front of sink)  Toileting: Stand by assistance     Activity Tolerance  Activity Tolerance: Patient tolerated treatment well  Bed mobility  Sit to Supine: Contact guard assistance  Transfers  Sit to stand: Contact guard assistance;Stand by assistance (cues for hand placement)  Stand to sit: Contact guard assistance;Stand by assistance  Transfer Comments: SBA/CGA sit to stand from standard arm chair     Cognition  Overall Cognitive Status: WNL  Orientation  Overall Orientation Status: Within Normal Limits  Orientation Level: Oriented X4                  Education Given To: Patient  Education Provided: Role of Therapy;Plan of Care;ADL Adaptive Strategies;Transfer Training  Education Method: Demonstration;Verbal  Education Outcome: Verbalized understanding;Continued education needed                        G-Code     OutComes Score                                                  AM-PAC Score        AM-[facility] Inpatient Daily Activity Raw Score: 21 (07/28/23 1309)  AM-PAC Inpatient ADL T-Scale Score : 44.27 (07/28/23 1309)  ADL Inpatient CMS 0-100% Score: 32.79 (07/28/23 1309)  ADL Inpatient CMS G-Code Modifier : Boris Graf (07/28/23 1309)           Goals  Short Term Goals  Time Frame for Short Term Goals: by dc  Short Term Goal 1: Pt will complete LE dressing w/ Mod I and use of AE prn-7/28 goal not met  Short Term Goal 2: Pt will complete toileting w/ Mod I-7/28 goal not met  Short Term Goal 3: Pt will complete functional transfers w/ Mod I-7/28 goal not met       Therapy Time   Individual Concurrent Group Co-treatment   Time In 1150         Time Out 1230         Minutes 40             Timed Code Treatment Minutes:  40    Total Treatment Minutes:   [address], OTR/L [facility]

## 2023-07-28 NOTE — PROGRESS NOTES
Discharge order placed and patient aware. PIV removed and dressing placed. Prescriptions sent to preferred pharmacy.  at bedside to transport home. Discharge instructions discussed with patient who verbalized understanding. All questions answered. Patient to car in wheelchair with all belongings.

## 2023-07-28 NOTE — CARE COORDINATION
Case Management Assessment            Discharge Note                    Date / Time of Note: 7/28/2023 1:00 PM                  Discharge Note Completed by: MARY Hagan    Patient Name: Park Lambert   YOB: 1960  Diagnosis: Osteoarthritis of right knee, unspecified osteoarthritis type [M17.11]  S/P knee surgery [Z98.890]  S/P total knee arthroplasty, right [Z96.651]   Date / Time: 7/26/2023  9:42 AM    Current PCP: Shahram Amaya patient: No    Hospitalization in the last 30 days: No       Advance Directives:  Code Status: Full Code  [place] DNR form completed and on chart: Not Indicated    Financial:  Payor: [employer] / Plan: [address] / Product Type: *No Product type* /      Pharmacy:    Ayde Walker [address] [place] [address]), [address] [place] [id number]  Phone: [phone] Fax: [address], [address] [place] [address]  [address]  [address] [address]  Phone: [phone] Fax: [phone]      Assistance purchasing medications?:    Assistance provided by Case Management: None at this time    Does patient want to participate in local refill/ meds to beds program?: Yes    Meds To Beds General Rules:  1. Can ONLY be done Monday- Friday between 8:30am-5pm  2. Prescription(s) must be in pharmacy by 3pm to be filled same day  3. Copy of patient's insurance/ prescription drug card and patient face sheet must be sent along with the prescription(s)  4. Cost of Rx cannot be added to hospital bill. If financial assistance is needed, please contact unit  or ;  or  CANNOT provide pharmacy voucher for patients co-pays  5.  Patients can then  the prescription on their way out of the hospital at discharge, or pharmacy can deliver to the bedside if staff is quality data associated with the providers.  Yes    Care Transitions patient: No    MARY Maddox  [facility]  Case Management Department  Ph: [phone]  Fax: [phone]

## 2023-07-30 LAB
EKG ATRIAL RATE: 95 BPM
EKG DIAGNOSIS: NORMAL
EKG P AXIS: 61 DEGREES
EKG P-R INTERVAL: 156 MS
EKG Q-T INTERVAL: 336 MS
EKG QRS DURATION: 80 MS
EKG QTC CALCULATION (BAZETT): 422 MS
EKG R AXIS: 45 DEGREES
EKG T AXIS: 33 DEGREES
EKG VENTRICULAR RATE: 95 BPM

## 2023-07-31 NOTE — PROGRESS NOTES
Date:  2023    Name:  Marjorie Bolton  Address:  [address] [address]    :  1960      Age:   59 y.o. Chief Complaint    Knee Pain (Pre surg discussion)      History of Present Illness:  Marjorie Bolton is a 59 y.o. female who presents for their pre-op examination. The patient is in good health. The patient has no history of blood clots, no organ dysfunction, not diabetes, no known allergies to medications, and tolerates pain medication. The patient drinks on occasion and does not smoke. They have family and friends that are able to assist him after surgery. The patient recently had a physical from her PCP and was cleared for surgery and stated being in good health. Patient's PCP note for preoperative clearance was reviewed. All the patient's labs were reviewed. Past Medical History:   Diagnosis Date    Asthma     Chronic back pain     Histoplasmosis     Kidney disease     PT STATES LOSING PROTEIN, BEEN FINE FOR 25 YRS        Past Surgical History:   Procedure Laterality Date    BACK SURGERY      FUSED     BUNIONECTOMY      OTHER SURGICAL HISTORY      PT STATES 13 TOTAL SURGERIES, WON'T GO INTO DETAIL    SHOULDER SURGERY      3 ROTATOR CUFF SURGERIES    TOTAL KNEE ARTHROPLASTY Right 2023    RIGHT TOTAL KNEE REPLACEMENT performed by Pedro Ramos MD at [facility] OR       No family history on file.     Social History     Socioeconomic History    Marital status:      Spouse name: None    Number of children: None    Years of education: None    Highest education level: None   Tobacco Use    Smoking status: Former    Smokeless tobacco: Never   Substance and Sexual Activity    Alcohol use: Yes     Comment: 2-3 DRINKS A WEEK, AWARE NOT TO DRINK DAY BEFORE OR DOS-DG    Drug use: Not Currently       Current Outpatient Medications   Medication Sig Dispense Refill    oxyCODONE (OXY-IR) 10 MG immediate release tablet Take 1 tablet by mouth every 4

## 2023-08-01 ENCOUNTER — HOSPITAL ENCOUNTER (OUTPATIENT)
Dept: PHYSICAL THERAPY | Age: 63
Setting detail: THERAPIES SERIES
Discharge: HOME | End: 2023-08-01
Payer: COMMERCIAL

## 2023-08-01 DIAGNOSIS — M25.562 LEFT KNEE PAIN, UNSPECIFIED CHRONICITY: ICD-10-CM

## 2023-08-01 PROCEDURE — 97530 THERAPEUTIC ACTIVITIES: CPT | Performed by: PHYSICAL THERAPIST

## 2023-08-01 PROCEDURE — 97110 THERAPEUTIC EXERCISES: CPT | Performed by: PHYSICAL THERAPIST

## 2023-08-01 PROCEDURE — 97112 NEUROMUSCULAR REEDUCATION: CPT | Performed by: PHYSICAL THERAPIST

## 2023-08-01 PROCEDURE — 97016 VASOPNEUMATIC DEVICE THERAPY: CPT | Performed by: PHYSICAL THERAPIST

## 2023-08-01 NOTE — PLAN OF CARE
ES (un) ([id number]):     GOALS: updated 7/25/23  Patient stated goal: Pt wants to return to prior level of function including swimming, pickelball, walking, and social events through reducing pain and becoming stronger, following surgery. [] Progressing: [] Met: [] Not Met: [] Adjusted     Therapist goals for Patient:   Short Term Goals: To be achieved in: 2 weeks  1. Independent in HEP and progression per patient tolerance, in order to prevent re-injury. [] Progressing: [] Met: [] Not Met: [] Adjusted   2. Patient will have a decrease in pain to facilitate improvement in movement, function, and ADLs as indicated by Functional Deficits. [] Progressing: [] Met: [] Not Met: [] Adjusted     Long Term Goals: To be achieved in: 8 weeks PO  1. Disability index score of <55 % deficit on LEFS to assist with reaching prior level of function. [] Progressing: [] Met: [] Not Met: [] Adjusted  2. Patient will demonstrate increased AROM to 120 deg to allow for proper joint functioning as indicated by patients Functional Deficits. [] Progressing: [] Met: [] Not Met: [] Adjusted  3. Patient will demonstrate an increase in Strength to good proximal hip strength and control in LE to allow for proper functional mobility as indicated by patients Functional Deficits. [] Progressing: [] Met: [] Not Met: [] Adjusted  4. Patient will return to household functional activities without increased symptoms or restriction. [] Progressing: [] Met: [] Not Met: [] Adjusted  5. Patient will be able to ambulate household distances without AD while maintaining proper gait mechanics. [] Progressing: [] Met: [] Not Met: [] Adjusted          Overall Progression Towards Functional goals/ Treatment Progress Update:  [] Patient is progressing as expected towards functional goals listed. [] Progression is slowed due to complexities/Impairments listed. [] Progression has been slowed due to co-morbidities.   [x] Plan just implemented, too

## 2023-08-01 NOTE — H&P
The patient's history and physical exam by her PCP for preoperative clearance was reviewed. The patient attest that nothing is changed since her visit with her PCP.         Anton Costello PA-C    Physician Assistant - Certified  [address] [place] [address]    08/01/23 5:28 PM home

## 2023-08-03 ENCOUNTER — HOSPITAL ENCOUNTER (OUTPATIENT)
Dept: PHYSICAL THERAPY | Age: 63
Setting detail: THERAPIES SERIES
Discharge: HOME | End: 2023-08-03
Payer: COMMERCIAL

## 2023-08-03 PROCEDURE — 97110 THERAPEUTIC EXERCISES: CPT | Performed by: PHYSICAL THERAPY ASSISTANT

## 2023-08-03 PROCEDURE — 97530 THERAPEUTIC ACTIVITIES: CPT | Performed by: PHYSICAL THERAPY ASSISTANT

## 2023-08-03 PROCEDURE — 97016 VASOPNEUMATIC DEVICE THERAPY: CPT | Performed by: PHYSICAL THERAPY ASSISTANT

## 2023-08-03 PROCEDURE — 97112 NEUROMUSCULAR REEDUCATION: CPT | Performed by: PHYSICAL THERAPY ASSISTANT

## 2023-08-03 NOTE — FLOWSHEET NOTE
[facility] and [address] [place] [address] [address],  [facility], [address] [place] [address]  [facility], [address] [place] [address] Avenue  Phone: [phone]  Fax: [phone]        Physical Therapy Daily Treatment Note  Date:  8/3/2023    Patient Name:  Marjorie Bolton    :  1960  MRN: [id number]  Restrictions/Precautions:    Medical/Treatment Diagnosis Information:  Knee pain [M25.569]  Treatment Diagnosis: OA (R) (M17.11), Stiffness (R) (M25.661), Pain (R) (M25.561), Difficulty Walking (R26.2), Gait Abnormality (R26.9), Weakness (R53.1)  R TKR/lateral release 23  Meds:  Insurance/Certification information:  PT Insurance Information: [employer] 20 visits HARD MAX NO AUTH  Physician Information:  Pedro Ramos MD    Has the plan of care been signed (Y/N):        [x]  Yes  []  No     Date of Patient follow up with Physician: 1, 3, 6 and 12 weeks PO  8-3-23: seen by PA: med discussion    Is this a Progress Report:     [x]  Yes  []  No        If Yes:  Date Range for reporting period:  Beginning 2023  Endin23    Progress report will be due (10 Rx or 30 days whichever is less): 14      Recertification will be due (POC Duration  / 90 days whichever is less): 10/25/23        Visit # Insurance Allowable Auth Required   4 20 (HARD MAX) []  Yes [x]  No          OUTCOME MEASURE DATE SCORE   LEFS 2023 Deficit: 70%   LEFS 23 pre-op Deficit: 75%       Latex Allergy:  [x]NO      []YES  Preferred Language for Healthcare:   [x]English       []other:    Pain level:  7-8/10    SUBJECTIVE:  Constant pain, stiffness reported. She is taking percocet for pain. She is using ice machine throughout the day which really helps.       OBJECTIVE:    8/3/23          ROM PROM AROM Overpressure Comment     L R L R L R     Flexion WNL 84         Extension WNL   -8 cold  -6 after tx                                               23 pre-op  Strength L

## 2023-08-06 ENCOUNTER — APPOINTMENT (OUTPATIENT)
Dept: CT IMAGING | Age: 63
DRG: 948 | End: 2023-08-06
Payer: COMMERCIAL

## 2023-08-06 ENCOUNTER — HOSPITAL ENCOUNTER (INPATIENT)
Age: 63
LOS: 1 days | Discharge: HOME | DRG: 948 | End: 2023-08-07
Attending: STUDENT IN AN ORGANIZED HEALTH CARE EDUCATION/TRAINING PROGRAM | Admitting: INTERNAL MEDICINE
Payer: COMMERCIAL

## 2023-08-06 ENCOUNTER — APPOINTMENT (OUTPATIENT)
Dept: GENERAL RADIOLOGY | Age: 63
DRG: 948 | End: 2023-08-06
Payer: COMMERCIAL

## 2023-08-06 DIAGNOSIS — M25.561 ACUTE PAIN OF RIGHT KNEE: Primary | ICD-10-CM

## 2023-08-06 DIAGNOSIS — G89.18 POST-OP PAIN: ICD-10-CM

## 2023-08-06 DIAGNOSIS — G89.18 POST-OP PAIN: Primary | ICD-10-CM

## 2023-08-06 LAB
ANION GAP SERPL CALCULATED.3IONS-SCNC: 11 MMOL/L (ref 3–16)
BASOPHILS # BLD: 0.1 K/UL (ref 0–0.2)
BASOPHILS NFR BLD: 1.4 %
BUN SERPL-MCNC: 18 MG/DL (ref 7–20)
CALCIUM SERPL-MCNC: 10.1 MG/DL (ref 8.3–10.6)
CHLORIDE SERPL-SCNC: 92 MMOL/L (ref 99–110)
CO2 SERPL-SCNC: 31 MMOL/L (ref 21–32)
CREAT SERPL-MCNC: 0.9 MG/DL (ref 0.6–1.2)
DEPRECATED RDW RBC AUTO: 14.3 % (ref 12.4–15.4)
EOSINOPHIL # BLD: 0.4 K/UL (ref 0–0.6)
EOSINOPHIL NFR BLD: 4.1 %
ERYTHROCYTE [SEDIMENTATION RATE] IN BLOOD BY WESTERGREN METHOD: 64 MM/HR (ref 0–30)
GFR SERPLBLD CREATININE-BSD FMLA CKD-EPI: >60 ML/MIN/{1.73_M2}
GLUCOSE SERPL-MCNC: 140 MG/DL (ref 70–99)
HCT VFR BLD AUTO: 28.3 % (ref 36–48)
HGB BLD-MCNC: 9.5 G/DL (ref 12–16)
LYMPHOCYTES # BLD: 1.6 K/UL (ref 1–5.1)
LYMPHOCYTES NFR BLD: 18.3 %
MCH RBC QN AUTO: 28.5 PG (ref 26–34)
MCHC RBC AUTO-ENTMCNC: 33.6 G/DL (ref 31–36)
MCV RBC AUTO: 84.9 FL (ref 80–100)
MONOCYTES # BLD: 0.7 K/UL (ref 0–1.3)
MONOCYTES NFR BLD: 8.3 %
NEUTROPHILS # BLD: 6 K/UL (ref 1.7–7.7)
NEUTROPHILS NFR BLD: 67.9 %
NT-PROBNP SERPL-MCNC: <36 PG/ML (ref 0–124)
PLATELET # BLD AUTO: 481 K/UL (ref 135–450)
PMV BLD AUTO: 6.3 FL (ref 5–10.5)
POTASSIUM SERPL-SCNC: 3.8 MMOL/L (ref 3.5–5.1)
RBC # BLD AUTO: 3.34 M/UL (ref 4–5.2)
SODIUM SERPL-SCNC: 134 MMOL/L (ref 136–145)
TROPONIN, HIGH SENSITIVITY: 13 NG/L (ref 0–14)
TROPONIN, HIGH SENSITIVITY: 14 NG/L (ref 0–14)
WBC # BLD AUTO: 8.9 K/UL (ref 4–11)

## 2023-08-06 PROCEDURE — 71260 CT THORAX DX C+: CPT

## 2023-08-06 PROCEDURE — 84484 ASSAY OF TROPONIN QUANT: CPT

## 2023-08-06 PROCEDURE — 96376 TX/PRO/DX INJ SAME DRUG ADON: CPT

## 2023-08-06 PROCEDURE — 85652 RBC SED RATE AUTOMATED: CPT

## 2023-08-06 PROCEDURE — 73562 X-RAY EXAM OF KNEE 3: CPT

## 2023-08-06 PROCEDURE — 96374 THER/PROPH/DIAG INJ IV PUSH: CPT

## 2023-08-06 PROCEDURE — 86140 C-REACTIVE PROTEIN: CPT

## 2023-08-06 PROCEDURE — 83880 ASSAY OF NATRIURETIC PEPTIDE: CPT

## 2023-08-06 PROCEDURE — 36415 COLL VENOUS BLD VENIPUNCTURE: CPT

## 2023-08-06 PROCEDURE — 85025 COMPLETE CBC W/AUTO DIFF WBC: CPT

## 2023-08-06 PROCEDURE — 80048 BASIC METABOLIC PNL TOTAL CA: CPT

## 2023-08-06 PROCEDURE — 6360000004 HC RX CONTRAST MEDICATION: Performed by: STUDENT IN AN ORGANIZED HEALTH CARE EDUCATION/TRAINING PROGRAM

## 2023-08-06 PROCEDURE — 99285 EMERGENCY DEPT VISIT HI MDM: CPT

## 2023-08-06 PROCEDURE — 6360000002 HC RX W HCPCS

## 2023-08-06 RX ORDER — OXYCODONE HYDROCHLORIDE 10 MG/1
10 TABLET ORAL EVERY 4 HOURS PRN
Qty: 30 TABLET | Refills: 0 | Status: SHIPPED | OUTPATIENT
Start: 2023-08-06 | End: 2023-08-11

## 2023-08-06 RX ORDER — OXYCODONE HYDROCHLORIDE 10 MG/1
10 TABLET ORAL EVERY 6 HOURS PRN
Qty: 28 TABLET | Refills: 0 | Status: SHIPPED | OUTPATIENT
Start: 2023-08-06 | End: 2023-08-06 | Stop reason: SDUPTHER

## 2023-08-06 RX ADMIN — IOPAMIDOL 75 ML: 755 INJECTION, SOLUTION INTRAVENOUS at 22:22

## 2023-08-06 RX ADMIN — HYDROMORPHONE HYDROCHLORIDE 1 MG: 1 INJECTION, SOLUTION INTRAMUSCULAR; INTRAVENOUS; SUBCUTANEOUS at 21:43

## 2023-08-06 RX ADMIN — HYDROMORPHONE HYDROCHLORIDE 0.5 MG: 1 INJECTION, SOLUTION INTRAMUSCULAR; INTRAVENOUS; SUBCUTANEOUS at 23:47

## 2023-08-06 ASSESSMENT — PAIN SCALES - GENERAL
PAINLEVEL_OUTOF10: 9
PAINLEVEL_OUTOF10: 9
PAINLEVEL_OUTOF10: 8

## 2023-08-06 ASSESSMENT — PAIN - FUNCTIONAL ASSESSMENT: PAIN_FUNCTIONAL_ASSESSMENT: 0-10

## 2023-08-07 ENCOUNTER — APPOINTMENT (OUTPATIENT)
Dept: VASCULAR LAB | Age: 63
DRG: 948 | End: 2023-08-07
Payer: COMMERCIAL

## 2023-08-07 VITALS
HEIGHT: 62 IN | HEART RATE: 74 BPM | RESPIRATION RATE: 18 BRPM | OXYGEN SATURATION: 98 % | TEMPERATURE: 97.8 F | BODY MASS INDEX: 35.01 KG/M2 | DIASTOLIC BLOOD PRESSURE: 78 MMHG | WEIGHT: 190.26 LBS | SYSTOLIC BLOOD PRESSURE: 136 MMHG

## 2023-08-07 PROBLEM — M25.561 KNEE PAIN, RIGHT: Status: ACTIVE | Noted: 2023-08-07

## 2023-08-07 LAB — CRP SERPL-MCNC: 134.5 MG/L (ref 0–5.1)

## 2023-08-07 PROCEDURE — 93971 EXTREMITY STUDY: CPT

## 2023-08-07 PROCEDURE — 1200000000 HC SEMI PRIVATE

## 2023-08-07 PROCEDURE — 94150 VITAL CAPACITY TEST: CPT

## 2023-08-07 PROCEDURE — 2580000003 HC RX 258: Performed by: INTERNAL MEDICINE

## 2023-08-07 PROCEDURE — 6370000000 HC RX 637 (ALT 250 FOR IP): Performed by: INTERNAL MEDICINE

## 2023-08-07 PROCEDURE — 6360000002 HC RX W HCPCS: Performed by: INTERNAL MEDICINE

## 2023-08-07 RX ORDER — OXYCODONE HYDROCHLORIDE 5 MG/1
10 TABLET ORAL EVERY 4 HOURS PRN
Status: DISCONTINUED | OUTPATIENT
Start: 2023-08-07 | End: 2023-08-07 | Stop reason: HOSPADM

## 2023-08-07 RX ORDER — ACETAMINOPHEN 650 MG/1
650 SUPPOSITORY RECTAL EVERY 6 HOURS PRN
Status: DISCONTINUED | OUTPATIENT
Start: 2023-08-07 | End: 2023-08-07 | Stop reason: HOSPADM

## 2023-08-07 RX ORDER — ACETAMINOPHEN 325 MG/1
650 TABLET ORAL EVERY 6 HOURS PRN
Status: DISCONTINUED | OUTPATIENT
Start: 2023-08-07 | End: 2023-08-07 | Stop reason: HOSPADM

## 2023-08-07 RX ORDER — KETOROLAC TROMETHAMINE 30 MG/ML
30 INJECTION, SOLUTION INTRAMUSCULAR; INTRAVENOUS EVERY 6 HOURS PRN
Status: DISCONTINUED | OUTPATIENT
Start: 2023-08-07 | End: 2023-08-07 | Stop reason: HOSPADM

## 2023-08-07 RX ORDER — SODIUM CHLORIDE 0.9 % (FLUSH) 0.9 %
5-40 SYRINGE (ML) INJECTION PRN
Status: DISCONTINUED | OUTPATIENT
Start: 2023-08-07 | End: 2023-08-07 | Stop reason: HOSPADM

## 2023-08-07 RX ORDER — ONDANSETRON 2 MG/ML
4 INJECTION INTRAMUSCULAR; INTRAVENOUS EVERY 6 HOURS PRN
Status: DISCONTINUED | OUTPATIENT
Start: 2023-08-07 | End: 2023-08-07 | Stop reason: HOSPADM

## 2023-08-07 RX ORDER — ONDANSETRON 4 MG/1
4 TABLET, ORALLY DISINTEGRATING ORAL EVERY 8 HOURS PRN
Status: DISCONTINUED | OUTPATIENT
Start: 2023-08-07 | End: 2023-08-07 | Stop reason: HOSPADM

## 2023-08-07 RX ORDER — POLYETHYLENE GLYCOL 3350 17 G/17G
17 POWDER, FOR SOLUTION ORAL DAILY PRN
Status: DISCONTINUED | OUTPATIENT
Start: 2023-08-07 | End: 2023-08-07 | Stop reason: HOSPADM

## 2023-08-07 RX ORDER — ENOXAPARIN SODIUM 100 MG/ML
40 INJECTION SUBCUTANEOUS DAILY
Status: DISCONTINUED | OUTPATIENT
Start: 2023-08-07 | End: 2023-08-07 | Stop reason: HOSPADM

## 2023-08-07 RX ORDER — SODIUM CHLORIDE 0.9 % (FLUSH) 0.9 %
5-40 SYRINGE (ML) INJECTION EVERY 12 HOURS SCHEDULED
Status: DISCONTINUED | OUTPATIENT
Start: 2023-08-07 | End: 2023-08-07 | Stop reason: HOSPADM

## 2023-08-07 RX ORDER — SODIUM CHLORIDE 9 MG/ML
INJECTION, SOLUTION INTRAVENOUS PRN
Status: DISCONTINUED | OUTPATIENT
Start: 2023-08-07 | End: 2023-08-07 | Stop reason: HOSPADM

## 2023-08-07 RX ADMIN — HYDROMORPHONE HYDROCHLORIDE 0.5 MG: 1 INJECTION, SOLUTION INTRAMUSCULAR; INTRAVENOUS; SUBCUTANEOUS at 07:39

## 2023-08-07 RX ADMIN — OXYCODONE HYDROCHLORIDE 10 MG: 5 TABLET ORAL at 02:48

## 2023-08-07 RX ADMIN — OXYCODONE HYDROCHLORIDE 10 MG: 5 TABLET ORAL at 13:21

## 2023-08-07 RX ADMIN — KETOROLAC TROMETHAMINE 30 MG: 30 INJECTION, SOLUTION INTRAMUSCULAR; INTRAVENOUS at 02:48

## 2023-08-07 RX ADMIN — KETOROLAC TROMETHAMINE 30 MG: 30 INJECTION, SOLUTION INTRAMUSCULAR; INTRAVENOUS at 10:22

## 2023-08-07 RX ADMIN — HYDROMORPHONE HYDROCHLORIDE 0.5 MG: 1 INJECTION, SOLUTION INTRAMUSCULAR; INTRAVENOUS; SUBCUTANEOUS at 11:40

## 2023-08-07 RX ADMIN — HYDROMORPHONE HYDROCHLORIDE 0.5 MG: 1 INJECTION, SOLUTION INTRAMUSCULAR; INTRAVENOUS; SUBCUTANEOUS at 18:55

## 2023-08-07 RX ADMIN — ENOXAPARIN SODIUM 40 MG: 100 INJECTION SUBCUTANEOUS at 10:19

## 2023-08-07 RX ADMIN — Medication 5 ML: at 10:26

## 2023-08-07 ASSESSMENT — PAIN DESCRIPTION - LOCATION
LOCATION: KNEE
LOCATION: ABDOMEN;BACK
LOCATION: KNEE

## 2023-08-07 ASSESSMENT — PAIN DESCRIPTION - ONSET
ONSET: ON-GOING

## 2023-08-07 ASSESSMENT — PAIN SCALES - GENERAL
PAINLEVEL_OUTOF10: 9
PAINLEVEL_OUTOF10: 5
PAINLEVEL_OUTOF10: 2
PAINLEVEL_OUTOF10: 6
PAINLEVEL_OUTOF10: 8
PAINLEVEL_OUTOF10: 3
PAINLEVEL_OUTOF10: 4
PAINLEVEL_OUTOF10: 7
PAINLEVEL_OUTOF10: 5
PAINLEVEL_OUTOF10: 4
PAINLEVEL_OUTOF10: 8

## 2023-08-07 ASSESSMENT — PAIN DESCRIPTION - DESCRIPTORS
DESCRIPTORS: ACHING;CRAMPING;DISCOMFORT
DESCRIPTORS: ACHING;CRAMPING;CRUSHING
DESCRIPTORS: ACHING;DISCOMFORT;SHARP;SORE
DESCRIPTORS: ACHING;CRAMPING
DESCRIPTORS: ACHING;CRAMPING;DISCOMFORT
DESCRIPTORS: ACHING;DISCOMFORT
DESCRIPTORS: ACHING;CRAMPING;DISCOMFORT
DESCRIPTORS: ACHING;CRAMPING

## 2023-08-07 ASSESSMENT — PAIN DESCRIPTION - FREQUENCY
FREQUENCY: CONTINUOUS
FREQUENCY: INTERMITTENT
FREQUENCY: INTERMITTENT
FREQUENCY: CONTINUOUS
FREQUENCY: INTERMITTENT

## 2023-08-07 ASSESSMENT — PAIN DESCRIPTION - PAIN TYPE
TYPE: SURGICAL PAIN
TYPE: SURGICAL PAIN
TYPE: ACUTE PAIN;SURGICAL PAIN
TYPE: SURGICAL PAIN
TYPE: ACUTE PAIN;SURGICAL PAIN
TYPE: SURGICAL PAIN
TYPE: SURGICAL PAIN

## 2023-08-07 ASSESSMENT — PAIN DESCRIPTION - ORIENTATION
ORIENTATION: LEFT
ORIENTATION: RIGHT;MID
ORIENTATION: LEFT
ORIENTATION: RIGHT;MID

## 2023-08-07 ASSESSMENT — PAIN - FUNCTIONAL ASSESSMENT
PAIN_FUNCTIONAL_ASSESSMENT: PREVENTS OR INTERFERES SOME ACTIVE ACTIVITIES AND ADLS
PAIN_FUNCTIONAL_ASSESSMENT: ACTIVITIES ARE NOT PREVENTED
PAIN_FUNCTIONAL_ASSESSMENT: PREVENTS OR INTERFERES SOME ACTIVE ACTIVITIES AND ADLS

## 2023-08-07 NOTE — ED PROVIDER NOTES
ED Attending Attestation Note     Date of evaluation: 8/6/2023    This patient was seen by the resident. I have seen and examined the patient, agree with the workup, evaluation, management and diagnosis. The care plan has been discussed. My assessment reveals 81-year-old female who is 12 days postop from a right TKA presents with worsening pain, swelling and warmth of the right knee and upper calf. On my exam, her knee is edematous and tender to palpation. She also has a tender calf. She is tachycardic on arrival and does have a mild oxygen requirement. She was scanned for PE which was negative. Clinical suspicion for DVT versus postop infection. We spoke with orthopedics who recommended withholding antibiotics until he can evaluate. We will have her admitted the hospital, plan for DVT study tomorrow.      Zelalem Wolf MD  08/07/23 6165

## 2023-08-07 NOTE — CARE COORDINATION
Case Management Assessment  Initial Evaluation    Date/Time of Evaluation: 8/7/2023 3:51 PM  Assessment Completed by: Danielle Adames RN    If patient is discharged prior to next notation, then this note serves as note for discharge by case management. Patient Name: Abby Gonsalves                   YOB: 1960  Diagnosis: Knee pain, right [M25.561]  Acute pain of right knee [M25.561]                   Date / Time: 8/6/2023  8:42 PM    Patient Admission Status: Inpatient   Readmission Risk (Low < 19, Mod (19-27), High > 27): Readmission Risk Score: 9.6    Current PCP: Kayla Hicks  PCP verified by CM? No    Chart Reviewed: Yes      History Provided by: Patient  Patient Orientation: Alert and Oriented    Patient Cognition: Alert    Hospitalization in the last 30 days (Readmission):  Yes    If yes, Readmission Assessment in CM Navigator will be completed. Advance Directives:      Code Status: Full Code   Patient's Primary Decision Maker is: Legal Next of Kin      Discharge Planning:    Patient lives with: Spouse/Significant Other Type of Home: House  Primary Care Giver: Self  Patient Support Systems include: Spouse/Significant Other   Current Financial resources: None  Current community resources: None  Current services prior to admission: Durable Medical Equipment, Prescription Assistance, Transportation            Current DME:              Type of Home Care services:  OT, PT, Skilled Therapy    ADLS  Prior functional level: Independent in ADLs/IADLs  Current functional level: Independent in ADLs/IADLs    PT AM-PAC:   /24  OT AM-PAC:   /24    Family can provide assistance at DC: Yes  Would you like Case Management to discuss the discharge plan with any other family members/significant others, and if so, who? No  Plans to Return to Present Housing: Yes  Other Identified Issues/Barriers to RETURNING to current housing: none  Potential Assistance needed at discharge:  Outpatient PT/OT,

## 2023-08-07 NOTE — H&P
V2.0  History and Physical      Name:  Chava Jorge /Age/Sex: 1960  (59 y.o. female)   MRN & CSN:  [id number] & [id number] Encounter Date/Time: 2023 12:36 AM EDT   [id number] PCP: Kolby Altamirano Day: 2    Assessment and Plan:   Chava Jorge is a 59 y.o. female with a pmh of Asthma, essential hypertension, hyperlipidemia who presents with Knee pain, right    Hospital Problems             Last Modified POA    * (Principal) Knee pain, right 2023 Yes   #Worsening right knee pain with associated swelling and decreased ROM  Right TKA 11 days ago  ESR = 64  Concern for DVT versus postoperative infection  ED physician has spoken with orthopedic service/Dr. Crabtree [place]  No IV antibiotics for now until seen in the morning    #Hypoxia requiring supplemental oxygen via nasal cannula-2 L/min  Given hypoxia and initial tachycardia (96) and concern for right leg DVT a CTPA was obtained. No evidence of acute pulmonary embolism  Likely secondary to bibasilar atelectasis    #Acute anemia-most consistent with recent orthopedic surgery    #Chronic medical conditions as mentioned in [address]:  Pain relief as ordered  Orthopedic surgery consult in a.m., antibiotics deferred at this time per orthopedic recommendations  DVT ultrasound of the right lower extremity in a.m. Incentive spirometry  Monitor CBC, BMP  Continue home medications appropriately  PT/OT after seen by orthopedic service  DVT prophylaxis    Disposition:   Current Living situation: Home  Expected Disposition: Home  Estimated D/C: To be decided    Diet ADULT DIET;  Regular   DVT Prophylaxis [x] Lovenox, []  Heparin, [] SCDs, [] Ambulation,  [] Eliquis, [] Xarelto, [] Coumadin   Code Status Full Code   Surrogate Decision Maker/ POA Patient     Personally reviewed Lab Studies are CBC, BMP and high-sensitivity troponin and Imaging     Discussed management of the case with ED provider who recommended admission for

## 2023-08-07 NOTE — ED NOTES
PROTEIN, BEEN FINE FOR 25 YRS       Assessment    Vitals/MEWS:    Level of Consciousness: Alert (0)   Vitals:    08/06/23 2116 08/06/23 2300 08/07/23 0000 08/07/23 0030   BP:  116/62 117/70 118/68   Pulse:  88 95 85   Resp:   16    Temp:       SpO2: 95% 92% 94% 94%   Weight:         FiO2 (%):   O2 Flow Rate: O2 Device: None (Room air) O2 Flow Rate (L/min): 2 L/min  Cardiac Rhythm:    Pain Assessment:  [x] Verbal [] Lollie Pritchett Scale  Pain Scale: Pain Assessment  Pain Assessment: 0-10  Pain Level: 8  Last documented pain score (0-10 scale) Pain Level: 8  Last documented pain medication administered: dilaudid 0.5 mg 2347  Mental Status: oriented and alert  Orientation Level:    NIH Score:    C-SSRS: Risk of Suicide: No Risk  Bedside swallow:    San[place] Coma Scale (GCS): Sanjuanita Coma Scale  Eye Opening: Spontaneous  Best Verbal Response: Oriented  Best Motor Response: Obeys commands  [place] Coma Scale Score: 15  Active LDA's:   Peripheral IV 08/06/23 Right Antecubital (Active)     PO Status:   Pertinent or High Risk Medications/Drips: no   If Yes, please provide details:   Pending Blood Product Administration: no       You may also review the ED PT Care Timeline found under the Summary Nursing Index tab. Recommendation    Pending orders   Plan for Discharge (if known): Additional Comments: Pt had a right total knee replacement a little over 1 week ago.   Started today with pain behind her knee and into her calf   If any further questions, please [place] RN at [phone]    Electronically signed by: Electronically signed by Jenniffer Colmenares RN on 8/7/2023 at 12:47 AM       Jenniffer Colmenares RN  08/07/23 6572

## 2023-08-07 NOTE — PLAN OF CARE
Problem: Discharge Planning  Goal: Discharge to home or other facility with appropriate resources  Outcome: Progressing     Problem: Pain  Goal: Verbalizes/displays adequate comfort level or baseline comfort level  Outcome: Progressing     Problem: Safety - Adult  Goal: Free from fall injury  Recent Flowsheet Documentation  Taken 8/7/2023 1904 by Analy La RN  Free From Fall Injury: Instruct family/caregiver on patient safety

## 2023-08-07 NOTE — PROGRESS NOTES
Patient is alert and orient X4, VSS patient is voiding adequately per BRP, Patient is tolerating a reg diet, patient is being discharged patient will be discharged home, transportation provided by her daughter, patients iv has been removed and belongings have been taken.

## 2023-08-07 NOTE — DISCHARGE SUMMARY
V2.0  Discharge Summary    Name:  Alexander Blancas /Age/Sex: 1960 (59 y.o. female)   Admit Date: 2023  Discharge Date: 23    MRN & CSN:  [id number] & [id number] Encounter Date and Time 23 5:53 PM EDT    Attending:  Alireza Alves MD Discharging Provider: Alireza Alves MD       Hospital Course:     Patient with right knee arthoplasty 12 days ago presented with acut/chronic knee pain. She was admitted for ortho eval. XR if the right knee showed post op swelling. Venous dopplar negative for DVT she was seen by ortho and they did not recommend any any procedure and recommended discharge and outpatient follow up. She was then discharged back home in stable condition and was advised to follow up with orthopaedic surgery for transition of care.      Discharge Instruction:     Primary care physician: Zion Henson within 2 weeks  Diet: regular diet   Activity: activity as tolerated  Disposition: Discharged to:   [x]Home, []C, []SNF, []Acute Rehab, []Hospice   Condition on discharge: Stable    Discharge Medications:        Medication List        CONTINUE taking these medications      albuterol (2.5 MG/3ML) 0.083% nebulizer solution  Commonly known as: PROVENTIL     ALBUTEROL IN     amLODIPine 10 MG tablet  Commonly known as: NORVASC     aspirin 81 MG EC tablet  Take 1 tablet by mouth in the morning and at bedtime     atorvastatin 20 MG tablet  Commonly known as: LIPITOR     ferrous sulfate 325 (65 Fe) MG tablet  Commonly known as: IRON 325     fluticasone 27.5 MCG/SPRAY nasal spray  Commonly known as: VERAMYST     fluticasone-salmeterol 100-50 MCG/DOSE diskus inhaler  Commonly known as: ADVAIR     gabapentin 100 MG capsule  Commonly known as: NEURONTIN     losartan 50 MG tablet  Commonly known as: COZAAR     ondansetron 4 MG disintegrating tablet  Commonly known as: ZOFRAN-ODT  Take 1 tablet by mouth 3 times daily as needed for Nausea or Vomiting     oxyCODONE HCl 10 MG immediate release

## 2023-08-07 NOTE — PROGRESS NOTES
4 Eyes Admission Assessment     I agree as the admission nurse that 2 RN's have performed a thorough Head to Toe Skin Assessment on the patient. ALL assessment sites listed below have been assessed on admission. Areas assessed by both nurses:   [x]   Head, Face, and Ears   [x]   Shoulders, Back, and Chest  [x]   Arms, Elbows, and Hands   [x]   Coccyx, Sacrum, and Ischium  [x]   Legs, Feet, and Heels- previous surgical site on right knee        Does the Patient have Skin Breakdown?   Yes a wound was noted on the Admission Assessment and an LDA was Initiated documentation include the Bibi-wound, Wound Assessment, Measurements, Dressing Treatment, Drainage, and Color\",         Zenon Prevention initiated:  Yes   Wound Care Orders initiated:  NA      WOC nurse consulted for Pressure Injury (Stage 3,4, Unstageable, DTI, NWPT, and Complex wounds) or Zenon score 18 or lower:  NA      Nurse 1 eSignature: Electronically signed by Moody Oliva RN on 8/7/23 at 2:16 AM EDT    **SHARE this note so that the co-signing nurse is able to place an eSignature**    Nurse 2 eSignature: Electronically signed by Kam Schafer RN on 8/7/23 at 2:54 AM EDT

## 2023-08-07 NOTE — PROGRESS NOTES
Patient was admitted after midnight. Refer to history and physical for details. I have seen and examined patient on round today. Will continue to follow. Ortho recs pending.

## 2023-08-07 NOTE — PLAN OF CARE
Patient was admitted to [phone], on 8/7/23. Vitals wdl. Patient placed on continuous tele. All labs and orders reviewed. Will continue to assess.         Problem: Discharge Planning  Goal: Discharge to home or other facility with appropriate resources  Outcome: Progressing     Problem: Pain  Goal: Verbalizes/displays adequate comfort level or baseline comfort level  Outcome: Progressing     Problem: Safety - Adult  Goal: Free from fall injury  Outcome: Progressing     Problem: ABCDS Injury Assessment  Goal: Absence of physical injury  Outcome: Progressing

## 2023-08-08 ENCOUNTER — HOSPITAL ENCOUNTER (OUTPATIENT)
Dept: PHYSICAL THERAPY | Age: 63
Setting detail: THERAPIES SERIES
Discharge: HOME | End: 2023-08-08
Payer: COMMERCIAL

## 2023-08-08 DIAGNOSIS — G89.18 POST-OPERATIVE PAIN: ICD-10-CM

## 2023-08-08 DIAGNOSIS — Z96.651 S/P TKR (TOTAL KNEE REPLACEMENT) USING CEMENT, RIGHT: Primary | ICD-10-CM

## 2023-08-08 PROCEDURE — 97112 NEUROMUSCULAR REEDUCATION: CPT | Performed by: PHYSICAL THERAPY ASSISTANT

## 2023-08-08 PROCEDURE — 97110 THERAPEUTIC EXERCISES: CPT | Performed by: PHYSICAL THERAPY ASSISTANT

## 2023-08-08 PROCEDURE — 97140 MANUAL THERAPY 1/> REGIONS: CPT | Performed by: PHYSICAL THERAPY ASSISTANT

## 2023-08-08 PROCEDURE — 97016 VASOPNEUMATIC DEVICE THERAPY: CPT | Performed by: PHYSICAL THERAPY ASSISTANT

## 2023-08-08 RX ORDER — HYDROMORPHONE HYDROCHLORIDE 2 MG/1
2 TABLET ORAL EVERY 4 HOURS PRN
Qty: 30 TABLET | Refills: 0 | Status: SHIPPED | OUTPATIENT
Start: 2023-08-08 | End: 2023-08-13

## 2023-08-08 NOTE — FLOWSHEET NOTE
x  1   [x] VASO  [x] Manual (68805) x   1   [] Other: Cold Pack  [] TA x      [] Mech Traction (52121)  [] ES(attended) (33884)      [] ES (un) (51869):     GOALS: updated 7/25/23  Patient stated goal: Pt wants to return to prior level of function including swimming, pickelball, walking, and social events through reducing pain and becoming stronger, following surgery. [] Progressing: [] Met: [] Not Met: [] Adjusted     Therapist goals for Patient:   Short Term Goals: To be achieved in: 2 weeks  1. Independent in HEP and progression per patient tolerance, in order to prevent re-injury. [] Progressing: [] Met: [] Not Met: [] Adjusted   2. Patient will have a decrease in pain to facilitate improvement in movement, function, and ADLs as indicated by Functional Deficits. [] Progressing: [] Met: [] Not Met: [] Adjusted     Long Term Goals: To be achieved in: 8 weeks PO  1. Disability index score of <55 % deficit on LEFS to assist with reaching prior level of function. [] Progressing: [] Met: [] Not Met: [] Adjusted  2. Patient will demonstrate increased AROM to 120 deg to allow for proper joint functioning as indicated by patients Functional Deficits. [] Progressing: [] Met: [] Not Met: [] Adjusted  3. Patient will demonstrate an increase in Strength to good proximal hip strength and control in LE to allow for proper functional mobility as indicated by patients Functional Deficits. [] Progressing: [] Met: [] Not Met: [] Adjusted  4. Patient will return to household functional activities without increased symptoms or restriction. [] Progressing: [] Met: [] Not Met: [] Adjusted  5. Patient will be able to ambulate household distances without AD while maintaining proper gait mechanics. [] Progressing: [] Met: [] Not Met: [] Adjusted          Overall Progression Towards Functional goals/ Treatment Progress Update:  [] Patient is progressing as expected towards functional goals listed.     [] Progression is

## 2023-08-08 NOTE — PROGRESS NOTES
[address]  Knee Pain    Date:  2023    Name:  Mateus Eli  Address:  [address] [address]    :  1960      Age:   59 y.o. Chief Complaint: Right knee pain    History of Present Illness:  Mateus Eli is a 59 y.o. female approximately 1 week status post a right total knee arthroplasty. At this time the patient is receiving pain medicine from the orthopedic surgery team (Dr. Lobito Coughlin and myself) for postop surgical pain. The patient is having some issues with pain control and therefore is being switched from oxycodone to Dilaudid. The patient has been counseled on appropriate utilization of this medication. Patient denies any signs or symptoms of drowsiness, loss of balance, euphoric sensation, or other symptoms of overuse or misuse of narcotic pain medication. The patient is being written pain medication for this major orthopedic surgery. Patient was educated to on the appropriate utilization of narcotic pain medication. They were given appropriate precautions and restrictions that they should strictly adhere to while taking narcotic pain medication including not conducting any activity that could put them or others at risk of harm or death. Patient was understanding of all instructions and agreeable with this plan. Orders Placed This Encounter   Medications    HYDROmorphone (DILAUDID) 2 MG tablet     Sig: Take 1 tablet by mouth every 4 hours as needed for Pain for up to 5 days. Max Daily Amount: 12 mg     Dispense:  30 tablet     Refill:  0     Patient is about 1 week status post a right total knee replacement. Pain is still inadequately controlled. We are switching her pain medicine from oxycodone to Dilaudid.  Call with questions, [phone]         Patient being given increased dosage/quantity of opioid pain medication in excess of OSMB guidelines which noted a 30 MED of opioids due to the

## 2023-08-09 NOTE — PROGRESS NOTES
Physician Progress Note      PATIENT:               Trudi Anderson  [facility] #:                  [id number]  :                       1960  ADMIT DATE:       2023 8:42 PM  [address] [facility] [address] DATE:        2023 7:37 PM  RESPONDING  PROVIDER #:        Marilyn King MD          QUERY TEXT:    Pt admitted with knee pain. Pt noted to have right knee total arthroplasty on   . . If possible, please document in progress notes and discharge summary   the relationship, if any, between acute/chronic knee pain and right knee   total arthroplasty on . .    The medical record reflects the following:  Risk Factors: right knee total arthroplasty , pain in right knee  Clinical Indicators: Patient has had right knee total arthroplasty on . Patient states her pain after the surgery was improving until 2-3 days ago   patient started experiencing significant pain and swelling in the knee as well   as popliteal fossa. Pain gets worse with weightbearing. _ortho progress note: On evaluation today, patient's pain is slightly improved   status post anti-inlammatory medications in house. Negative for DVT, PE. Treatment: X-ray of knee, Venous doppler, surgical consult, Dilaudid for pain   control  Options provided:  -- acute/chronic postoperative knee pain due to right knee total arthroplasty  -- acute/chronic knee pain unrelated to right knee total arthroplasty  -- Other - I will add my own diagnosis  -- Disagree - Not applicable / Not valid  -- Disagree - Clinically unable to determine / Unknown  -- Refer to Clinical Documentation Reviewer    PROVIDER RESPONSE TEXT:    This patient has acute/chronic postoperative knee pain due to right knee total   arthroplasty. Query created by: Kennedy Garcia on 2023 12:09 PM      Electronically signed by:   Marilyn King MD 2023 1:43 PM

## 2023-08-10 ENCOUNTER — HOSPITAL ENCOUNTER (OUTPATIENT)
Dept: PHYSICAL THERAPY | Age: 63
Setting detail: THERAPIES SERIES
Discharge: HOME | End: 2023-08-10
Payer: COMMERCIAL

## 2023-08-10 DIAGNOSIS — M17.0 PRIMARY OSTEOARTHRITIS OF BOTH KNEES: Primary | ICD-10-CM

## 2023-08-10 DIAGNOSIS — M25.561 ACUTE PAIN OF RIGHT KNEE: Primary | ICD-10-CM

## 2023-08-10 PROCEDURE — 97140 MANUAL THERAPY 1/> REGIONS: CPT | Performed by: PHYSICAL THERAPY ASSISTANT

## 2023-08-10 PROCEDURE — 97110 THERAPEUTIC EXERCISES: CPT | Performed by: PHYSICAL THERAPY ASSISTANT

## 2023-08-10 PROCEDURE — 97530 THERAPEUTIC ACTIVITIES: CPT | Performed by: PHYSICAL THERAPY ASSISTANT

## 2023-08-10 RX ORDER — MELOXICAM 15 MG/1
15 TABLET ORAL DAILY
Qty: 30 TABLET | Refills: 0 | Status: SHIPPED | OUTPATIENT
Start: 2023-08-10

## 2023-08-10 NOTE — FLOWSHEET NOTE
GOALS: updated 7/25/23  Patient stated goal: Pt wants to return to prior level of function including swimming, pickelball, walking, and social events through reducing pain and becoming stronger, following surgery. [] Progressing: [] Met: [] Not Met: [] Adjusted     Therapist goals for Patient:   Short Term Goals: To be achieved in: 2 weeks  1. Independent in HEP and progression per patient tolerance, in order to prevent re-injury. [] Progressing: [] Met: [] Not Met: [] Adjusted   2. Patient will have a decrease in pain to facilitate improvement in movement, function, and ADLs as indicated by Functional Deficits. [] Progressing: [] Met: [] Not Met: [] Adjusted     Long Term Goals: To be achieved in: 8 weeks PO  1. Disability index score of <55 % deficit on LEFS to assist with reaching prior level of function. [] Progressing: [] Met: [] Not Met: [] Adjusted  2. Patient will demonstrate increased AROM to 120 deg to allow for proper joint functioning as indicated by patients Functional Deficits. [] Progressing: [] Met: [] Not Met: [] Adjusted  3. Patient will demonstrate an increase in Strength to good proximal hip strength and control in LE to allow for proper functional mobility as indicated by patients Functional Deficits. [] Progressing: [] Met: [] Not Met: [] Adjusted  4. Patient will return to household functional activities without increased symptoms or restriction. [] Progressing: [] Met: [] Not Met: [] Adjusted  5. Patient will be able to ambulate household distances without AD while maintaining proper gait mechanics. [] Progressing: [] Met: [] Not Met: [] Adjusted          Overall Progression Towards Functional goals/ Treatment Progress Update:  [] Patient is progressing as expected towards functional goals listed. [] Progression is slowed due to complexities/Impairments listed. [] Progression has been slowed due to co-morbidities.   [x] Plan just implemented, too soon to assess

## 2023-08-14 DIAGNOSIS — Z96.651 S/P TOTAL KNEE REPLACEMENT USING CEMENT, RIGHT: Primary | ICD-10-CM

## 2023-08-14 RX ORDER — HYDROMORPHONE HYDROCHLORIDE 2 MG/1
2 TABLET ORAL EVERY 4 HOURS PRN
Qty: 30 TABLET | Refills: 0 | Status: SHIPPED | OUTPATIENT
Start: 2023-08-14 | End: 2023-08-19 | Stop reason: SDUPTHER

## 2023-08-14 NOTE — PROGRESS NOTES
[address]  Knee Pain    Date:  2023    Name:  Alexei Barrios  Address:  [address] [address]    :  1960      Age:   59 y.o. Chief Complaint: Right knee pain     History of Present Illness:  Alexei Barrios is a 59 y.o. female s/p a right TKR. At this time the patient is receiving pain medicine from the orthopedic surgery team (Dr. Allan Sandhoff and myself) for postop surgical pain. Pain medication is being written on a weekly basis after a thorough discussion with the patient and reassessment of the patient's condition to verify that the patient is receiving the lowest possible dose to manage the pain. I spoke with the patient today (23) and the patient states that they are doing much better with pain. They have been able to make further strides with home therapy and notes that their swelling has decreased. The patient feels that their pain is decreasing and is noticing improvements daily. Patient denies any signs or symptoms of drowsiness, loss of balance, euphoric sensation, or other symptoms of overuse or misuse of narcotic pain medication. The patient is being written pain medication for this major orthopedic surgery. Patient was educated to on the appropriate utilization of narcotic pain medication. They were given appropriate precautions and restrictions that they should strictly adhere to while taking narcotic pain medication including not conducting any activity that could put them or others at risk of harm or death. Patient was understanding of all instructions and agreeable with this plan. Orders Placed This Encounter   Medications    HYDROmorphone (DILAUDID) 2 MG tablet     Sig: Take 1 tablet by mouth every 4 hours as needed for Pain for up to 5 days.  Max Daily Amount: 12 mg     Dispense:  30 tablet     Refill:  0     Reduce doses taken as pain becomes manageable         Patient being

## 2023-08-15 ENCOUNTER — HOSPITAL ENCOUNTER (OUTPATIENT)
Dept: PHYSICAL THERAPY | Age: 63
Setting detail: THERAPIES SERIES
Discharge: HOME | End: 2023-08-15
Payer: COMMERCIAL

## 2023-08-15 PROCEDURE — 97530 THERAPEUTIC ACTIVITIES: CPT | Performed by: PHYSICAL THERAPY ASSISTANT

## 2023-08-15 PROCEDURE — 97140 MANUAL THERAPY 1/> REGIONS: CPT | Performed by: PHYSICAL THERAPY ASSISTANT

## 2023-08-15 PROCEDURE — 97016 VASOPNEUMATIC DEVICE THERAPY: CPT | Performed by: PHYSICAL THERAPY ASSISTANT

## 2023-08-15 PROCEDURE — 97110 THERAPEUTIC EXERCISES: CPT | Performed by: PHYSICAL THERAPY ASSISTANT

## 2023-08-15 NOTE — FLOWSHEET NOTE
Cold Pack  [x] TA x  1    [] [facility] Traction (36032)  [] ES(attended) (37303)      [] ES (un) (96933):     GOALS: updated 7/25/23  Patient stated goal: Pt wants to return to prior level of function including swimming, pickelball, walking, and social events through reducing pain and becoming stronger, following surgery. [] Progressing: [] Met: [] Not Met: [] Adjusted     Therapist goals for Patient:   Short Term Goals: To be achieved in: 2 weeks  1. Independent in HEP and progression per patient tolerance, in order to prevent re-injury. [] Progressing: [] Met: [] Not Met: [] Adjusted   2. Patient will have a decrease in pain to facilitate improvement in movement, function, and ADLs as indicated by Functional Deficits. [] Progressing: [] Met: [] Not Met: [] Adjusted     Long Term Goals: To be achieved in: 8 weeks PO  1. Disability index score of <55 % deficit on LEFS to assist with reaching prior level of function. [] Progressing: [] Met: [] Not Met: [] Adjusted  2. Patient will demonstrate increased AROM to 120 deg to allow for proper joint functioning as indicated by patients Functional Deficits. [] Progressing: [] Met: [] Not Met: [] Adjusted  3. Patient will demonstrate an increase in Strength to good proximal hip strength and control in LE to allow for proper functional mobility as indicated by patients Functional Deficits. [] Progressing: [] Met: [] Not Met: [] Adjusted  4. Patient will return to household functional activities without increased symptoms or restriction. [] Progressing: [] Met: [] Not Met: [] Adjusted  5. Patient will be able to ambulate household distances without AD while maintaining proper gait mechanics. [] Progressing: [] Met: [] Not Met: [] Adjusted          Overall Progression Towards Functional goals/ Treatment Progress Update:  [] Patient is progressing as expected towards functional goals listed. [] Progression is slowed due to complexities/Impairments listed.   []

## 2023-08-17 ENCOUNTER — OFFICE VISIT (OUTPATIENT)
Dept: ORTHOPEDIC SURGERY | Age: 63
End: 2023-08-17

## 2023-08-17 ENCOUNTER — HOSPITAL ENCOUNTER (OUTPATIENT)
Dept: PHYSICAL THERAPY | Age: 63
Setting detail: THERAPIES SERIES
Discharge: HOME | End: 2023-08-17
Payer: COMMERCIAL

## 2023-08-17 VITALS — HEIGHT: 62 IN | WEIGHT: 190 LBS | BODY MASS INDEX: 34.96 KG/M2

## 2023-08-17 DIAGNOSIS — Z96.651 S/P REVISION OF TOTAL KNEE, RIGHT: ICD-10-CM

## 2023-08-17 DIAGNOSIS — Z09 POSTOP CHECK: Primary | ICD-10-CM

## 2023-08-17 PROCEDURE — 97110 THERAPEUTIC EXERCISES: CPT | Performed by: PHYSICAL THERAPY ASSISTANT

## 2023-08-17 PROCEDURE — 99024 POSTOP FOLLOW-UP VISIT: CPT | Performed by: ORTHOPAEDIC SURGERY

## 2023-08-17 PROCEDURE — 97016 VASOPNEUMATIC DEVICE THERAPY: CPT | Performed by: PHYSICAL THERAPY ASSISTANT

## 2023-08-17 PROCEDURE — 97530 THERAPEUTIC ACTIVITIES: CPT | Performed by: PHYSICAL THERAPY ASSISTANT

## 2023-08-17 PROCEDURE — 97140 MANUAL THERAPY 1/> REGIONS: CPT | Performed by: PHYSICAL THERAPY ASSISTANT

## 2023-08-17 NOTE — PROGRESS NOTES
Chief Complaint    Post-Op Check (Right total knee joint replacement DOS: 7/26/2023)      History of Present Illness:  Cristela Mary is a 59 y.o. female who presents for follow up of right knee(s). Patient is 3 weeks status post right TKR done on 7/26/2023. Patient came to clinic following PT with Draian Rico today. She reported she was experiencing 7/10 prior to therapy, describing a feeling of tightness and pressure. Pain was exacerbated to 10/10 following therapy today after the ROM exercises. Patient reports taking Dilaudid every 4 hours and using ice to manage pain. Patient denies any new injury or burning pain. Pain Assessment:  Location: right  Level: 8 out of 10  Duration: Hours  Description: sharp  Result of an injury: No  Work related injury: No  Aggravating actions: PT exercises  Relieving Factors: rest, ice, and OTC medications  Wants injections: No     Past Medical History:   Diagnosis Date    Asthma     Chronic back pain     Histoplasmosis     Kidney disease     PT STATES LOSING PROTEIN, BEEN FINE FOR 25 YRS        Past Surgical History:   Procedure Laterality Date    BACK SURGERY      FUSED 4/23    BUNIONECTOMY      OTHER SURGICAL HISTORY      PT STATES 13 TOTAL SURGERIES, WON'T GO INTO DETAIL    SHOULDER SURGERY      3 ROTATOR CUFF SURGERIES    TOTAL KNEE ARTHROPLASTY Right 7/26/2023    RIGHT TOTAL KNEE REPLACEMENT performed by Carrie Maldonado MD at [address] OR       No family history on file.     Social History     Socioeconomic History    Marital status:      Spouse name: None    Number of children: None    Years of education: None    Highest education level: None   Tobacco Use    Smoking status: Former    Smokeless tobacco: Never   Substance and Sexual Activity    Alcohol use: Yes     Comment: 2-3 DRINKS A WEEK, AWARE NOT TO DRINK DAY BEFORE OR DOS-DG    Drug use: Not Currently       Current Outpatient Medications   Medication Sig Dispense Refill    HYDROmorphone (DILAUDID) 2 MG tablet Take 1

## 2023-08-17 NOTE — FLOWSHEET NOTE
[facility] and [address] [place] [address] [address],  [facility], [address] [place] [address]  [address]  Phone: [phone]  Fax: [phone]        Physical Therapy Daily Treatment Note  Date:  2023    Patient Name:  Giovanna Ayon    :  1960  MRN: [id number]  Restrictions/Precautions:    Medical/Treatment Diagnosis Information:  Knee pain [M25.569]  Treatment Diagnosis: OA (R) (M17.11), Stiffness (R) (M25.661), Pain (R) (M25.561), Difficulty Walking (R26.2), Gait Abnormality (R26.9), Weakness (R53.1)  R TKR/lateral release 23  Meds:  Insurance/Certification information:  PT Insurance Information: [employer] [place] 20 visits HARD MAX NO AUTH  Physician Information:  Aby Spears MD    Has the plan of care been signed (Y/N):        [x]  Yes  []  No     Date of Patient follow up with Physician: 1, 3, 6 and 12 weeks PO  8-3-23: seen by PA: med discussion  23: med discussion, review of ER report  8-10-23: med discussion, will prescribe mobic  23: seen in PT clinic -discussion for possibility of JAGUAR pending ROM progress, also seen in MD office for xray and 3 week check    Is this a Progress Report:     [x]  Yes  []  No        If Yes:  Date Range for reporting period:  Beginning 2023  Endin23    Progress report will be due (10 Rx or 30 days whichever is less): 9/10/94      Recertification will be due (POC Duration  / 90 days whichever is less): 10/25/23        Visit # Insurance Allowable Auth Required   8 20 (HARD MAX) []  Yes [x]  No          OUTCOME MEASURE DATE SCORE   LEFS 2023 Deficit: 70%   LEFS 23 pre-op Deficit: 75%       Latex Allergy:  [x]NO      []YES  Preferred Language for Healthcare:   [x]English       []other:    Pain level:  7/10    SUBJECTIVE:  Pain is somewhat better controlled. Constant pain, stiffness reported. She is taking dilaudid for pain, meloxicam for swelling.

## 2023-08-19 DIAGNOSIS — Z96.651 S/P TOTAL KNEE REPLACEMENT USING CEMENT, RIGHT: ICD-10-CM

## 2023-08-19 RX ORDER — HYDROMORPHONE HYDROCHLORIDE 2 MG/1
2 TABLET ORAL EVERY 4 HOURS PRN
Qty: 30 TABLET | Refills: 0 | Status: SHIPPED | OUTPATIENT
Start: 2023-08-19 | End: 2023-08-24

## 2023-08-19 NOTE — PROGRESS NOTES
[address]  Knee Pain    Date:  2023    Name:  Parker Davies  Address:  [address] [address]    :  1960      Age:   59 y.o. Chief Complaint: Right knee pain    History of Present Illness:  Parker Davies is a 59 y.o. female s/p a right TKR. At this time the patient is receiving pain medicine from the orthopedic surgery team (Dr. Miguel Dias and myself) for postop surgical pain. Patient denies any signs or symptoms of drowsiness, loss of balance, euphoric sensation, or other symptoms of overuse or misuse of narcotic pain medication. The patient is being written pain medication for this major orthopedic surgery. Patient was educated to on the appropriate utilization of narcotic pain medication. They were given appropriate precautions and restrictions that they should strictly adhere to while taking narcotic pain medication including not conducting any activity that could put them or others at risk of harm or death. Patient was understanding of all instructions and agreeable with this plan. Orders Placed This Encounter   Medications    HYDROmorphone (DILAUDID) 2 MG tablet     Sig: Take 1 tablet by mouth every 4 hours as needed for Pain for up to 5 days. Max Daily Amount: 12 mg     Dispense:  30 tablet     Refill:  0     Reduce doses taken as pain becomes manageable         Patient being given increased dosage/quantity of opioid pain medication in excess of O[employer] guidelines which noted a 30 MED of opioids due to the fact that she has undergone major orthopaedic surgery as outlined in rule 4731. Dosages and further duration of the pain medication will be re-evaluated at her post op visit. Patient was educated on dosing expectations and limits of prescribing as a result of the new [facility] Board rules enacted 2017.   Please also note that this is not the initial opioid

## 2023-08-22 ENCOUNTER — HOSPITAL ENCOUNTER (OUTPATIENT)
Dept: PHYSICAL THERAPY | Age: 63
Setting detail: THERAPIES SERIES
Discharge: HOME | End: 2023-08-22
Payer: COMMERCIAL

## 2023-08-22 ENCOUNTER — OFFICE VISIT (OUTPATIENT)
Dept: ORTHOPEDIC SURGERY | Age: 63
End: 2023-08-22

## 2023-08-22 VITALS — HEIGHT: 62 IN | BODY MASS INDEX: 34.96 KG/M2 | WEIGHT: 190 LBS

## 2023-08-22 DIAGNOSIS — Z09 POSTOP CHECK: Primary | ICD-10-CM

## 2023-08-22 DIAGNOSIS — Z96.651 S/P TKR (TOTAL KNEE REPLACEMENT) USING CEMENT, RIGHT: ICD-10-CM

## 2023-08-22 PROCEDURE — 97140 MANUAL THERAPY 1/> REGIONS: CPT | Performed by: PHYSICAL THERAPY ASSISTANT

## 2023-08-22 PROCEDURE — 99024 POSTOP FOLLOW-UP VISIT: CPT | Performed by: PHYSICIAN ASSISTANT

## 2023-08-22 PROCEDURE — 97110 THERAPEUTIC EXERCISES: CPT | Performed by: PHYSICAL THERAPY ASSISTANT

## 2023-08-22 PROCEDURE — 97530 THERAPEUTIC ACTIVITIES: CPT | Performed by: PHYSICAL THERAPY ASSISTANT

## 2023-08-22 NOTE — FLOWSHEET NOTE
[facility] and [address] [place] [address] [address],  [facility], [address] [place] [address]  200 [facility], [address] [place] [address] Avenue  Phone: [phone]  Fax: [phone]        Physical Therapy Daily Treatment Note  Date:  2023    Patient Name:  Diane Wiggins    :  1960  MRN: [id number]  Restrictions/Precautions:    Medical/Treatment Diagnosis Information:  Knee pain [M25.569]  Treatment Diagnosis: OA (R) (M17.11), Stiffness (R) (M25.661), Pain (R) (M25.561), Difficulty Walking (R26.2), Gait Abnormality (R26.9), Weakness (R53.1)  R TKR/lateral release 23  Meds:  Insurance/Certification information:  PT Insurance Information: Medical [place] 20 visits HARD MAX NO AUTH  Physician Information:  Annamarie Smith MD    Has the plan of care been signed (Y/N):        [x]  Yes  []  No     Date of Patient follow up with Physician: 1, 3, 6 and 12 weeks PO  8-3-23: seen by PA: med discussion  23: med discussion, review of ER report  8-10-23: med discussion, will prescribe mobic  23: seen in PT clinic -discussion for possibility of JAGUAR pending ROM progress, also seen in MD office for xray and 3 week check    Is this a Progress Report:     [x]  Yes  []  No        If Yes:  Date Range for reporting period:  Beginning 2023  Endin23    Progress report will be due (10 Rx or 30 days whichever is less):       Recertification will be due (POC Duration  / 90 days whichever is less): 10/25/23        Visit # Insurance Allowable Auth Required   8 20 (HARD MAX) []  Yes [x]  No          OUTCOME MEASURE DATE SCORE   LEFS 2023 Deficit: 70%   LEFS 23 pre-op Deficit: 75%       Latex Allergy:  [x]NO      []YES  Preferred Language for Healthcare:   [x]English       []other:    Pain level:  7/10    SUBJECTIVE:  Constant pain, stiffness reported. States she is trying to complete hep as instructed.  She is taking dilaudid for pain, meloxicam

## 2023-08-23 NOTE — PROGRESS NOTES
Date:  2023    Name:  Alexander Blancas  Address:  [address] [address]    :  1960      Age:   59 y.o. Chief Complaint    Post-Op Check (Right knee. S/p tkr )      History of Present Illness:  Alexander Blancas is a 59 y.o. female who presents for a follow up of right knee. Patient is 4 weeks status post right TKR done on 2023. She reported she was experiencing 8/10 prior to therapy, describing a feeling of tightness and pressure. Patient reports taking Dilaudid every 4 hours and using ice to manage pain. Patient denies any new injury or burning pain. She states that she has been working hard over the weekend to increase her range of motion. She has a flexion arc at this time of approximately 5 to 95 degrees. Her pain control has been improving. The patient denies any new injury. The patient denies any catching, giving way, joint locking, numbness, paresthesias, or weakness. Pain Assessment  Location of Pain: Knee  Location Modifiers: Right  Severity of Pain: 8  Quality of Pain: Sharp, Dull  Duration of Pain: Persistent  Frequency of Pain: Constant  Aggravating Factors: Walking  Limiting Behavior: Yes  Relieving Factors: Rest  Result of Injury: No  Work-Related Injury: No  Are there other pain locations you wish to document?: No    Past Medical History:   Diagnosis Date    Asthma     Chronic back pain     Histoplasmosis     Kidney disease     PT STATES LOSING PROTEIN, BEEN FINE FOR 25 YRS        Past Surgical History:   Procedure Laterality Date    BACK SURGERY      FUSED     BUNIONECTOMY      OTHER SURGICAL HISTORY      PT STATES 13 TOTAL SURGERIES, WON'T GO INTO DETAIL    SHOULDER SURGERY      3 ROTATOR CUFF SURGERIES    TOTAL KNEE ARTHROPLASTY Right 2023    RIGHT TOTAL KNEE REPLACEMENT performed by Raul Chen MD at [facility]       History reviewed. No pertinent family history.     Social History     Socioeconomic History    Marital

## 2023-08-24 ENCOUNTER — HOSPITAL ENCOUNTER (OUTPATIENT)
Dept: PHYSICAL THERAPY | Age: 63
Setting detail: THERAPIES SERIES
Discharge: HOME | End: 2023-08-24
Payer: COMMERCIAL

## 2023-08-24 PROCEDURE — 97110 THERAPEUTIC EXERCISES: CPT | Performed by: PHYSICAL THERAPY ASSISTANT

## 2023-08-24 PROCEDURE — 97016 VASOPNEUMATIC DEVICE THERAPY: CPT | Performed by: PHYSICAL THERAPY ASSISTANT

## 2023-08-24 PROCEDURE — 97140 MANUAL THERAPY 1/> REGIONS: CPT | Performed by: PHYSICAL THERAPY ASSISTANT

## 2023-08-26 DIAGNOSIS — Z96.651 S/P TKR (TOTAL KNEE REPLACEMENT) USING CEMENT, RIGHT: Primary | ICD-10-CM

## 2023-08-26 RX ORDER — HYDROMORPHONE HYDROCHLORIDE 2 MG/1
2 TABLET ORAL EVERY 4 HOURS PRN
Qty: 30 TABLET | Refills: 0 | Status: SHIPPED | OUTPATIENT
Start: 2023-08-26 | End: 2023-08-31

## 2023-08-26 NOTE — PROGRESS NOTES
given increased dosage/quantity of opioid pain medication in excess of [facility]SM[employer] guidelines which noted a 30 MED of opioids due to the fact that she has undergone major orthopaedic surgery as outlined in rule 4731-11-13. Dosages and further duration of the pain medication will be re-evaluated at her post op visit. Patient was educated on dosing expectations and limits of prescribing as a result of the new [facility] Board rules enacted August 31, 2017. Please also note that this is not the initial opioid prescription issued to this patient but a continuation of medication utilized during the hospital admission as noted in the medical record. [place]ARRS report has also been utilized to screen for any abuse history or suspicious activity as outlined in [place]. All efforts have been taken to prevent abuse potential and misuse of opioid medications. Alok Ronquillo PA-C    Physician Assistant - Certified  [address] [place] [address]    08/26/23 6:31 PM        This dictation was performed with a verbal recognition program (DRAGON) and it was checked for errors. It is possible that there are still dictated errors within this office note. If so, please bring any errors to my attention for an addendum. All efforts were made to ensure that this office note is accurate.

## 2023-08-29 ENCOUNTER — HOSPITAL ENCOUNTER (OUTPATIENT)
Dept: PHYSICAL THERAPY | Age: 63
Setting detail: THERAPIES SERIES
Discharge: HOME | End: 2023-08-29
Payer: COMMERCIAL

## 2023-08-29 PROCEDURE — 97140 MANUAL THERAPY 1/> REGIONS: CPT | Performed by: PHYSICAL THERAPY ASSISTANT

## 2023-08-29 PROCEDURE — 97110 THERAPEUTIC EXERCISES: CPT | Performed by: PHYSICAL THERAPY ASSISTANT

## 2023-08-29 NOTE — FLOWSHEET NOTE
[facility] and [address] [place] [address] [address],  [facility], [address] [place] [address]  [address]  Phone: [phone]  Fax: [phone]        Physical Therapy Daily Treatment Note  Date:  2023    Patient Name:  Landon iSmpson    :  1960  MRN: [id number]  Restrictions/Precautions:    Medical/Treatment Diagnosis Information:  Knee pain [M25.569]  Treatment Diagnosis: OA (R) (M17.11), Stiffness (R) (M25.661), Pain (R) (M25.561), Difficulty Walking (R26.2), Gait Abnormality (R26.9), Weakness (R53.1)  R TKR/lateral release 23  Meds:  Insurance/Certification information:  PT Insurance Information: Medical [place] 20 visits HARD MAX NO AUTH  Physician Information:  Diamond Padron MD    Has the plan of care been signed (Y/N):        [x]  Yes  []  No     Date of Patient follow up with Physician: 1, 3, 6 and 12 weeks PO  8-3-23: seen by PA: med discussion  23: med discussion, review of ER report  8-10-23: med discussion, will prescribe mobic  23: seen in PT clinic -discussion for possibility of JAGUAR pending ROM progress, also seen in MD office for xray and 3 week check    Is this a Progress Report:     [x]  Yes  []  No        If Yes:  Date Range for reporting period:  Beginning 2023  Endin23    Progress report will be due (10 Rx or 30 days whichever is less):       Recertification will be due (POC Duration  / 90 days whichever is less): 10/25/23        Visit # Insurance Allowable Auth Required   11 20 (HARD MAX) []  Yes [x]  No          OUTCOME MEASURE DATE SCORE   LEFS 2023 Deficit: 70%   LEFS 23 pre-op Deficit: 75%       Latex Allergy:  [x]NO      []YES  Preferred Language for Healthcare:   [x]English       []other:    Pain level:  7/10    SUBJECTIVE:  5 weeks post op. Constant pain, stiffness reported.   States she is trying to complete hep as instructed even weight hangs for

## 2023-08-31 ENCOUNTER — HOSPITAL ENCOUNTER (OUTPATIENT)
Dept: PHYSICAL THERAPY | Age: 63
Setting detail: THERAPIES SERIES
Discharge: HOME | End: 2023-08-31
Payer: COMMERCIAL

## 2023-08-31 PROCEDURE — 97140 MANUAL THERAPY 1/> REGIONS: CPT | Performed by: PHYSICAL THERAPY ASSISTANT

## 2023-08-31 PROCEDURE — 97110 THERAPEUTIC EXERCISES: CPT | Performed by: PHYSICAL THERAPY ASSISTANT

## 2023-08-31 NOTE — FLOWSHEET NOTE
face-to-face)  [] EVAL (MOD) 12962 (typically 30 minutes face-to-face)  [] EVAL (HIGH) 99382 (typically 45 minutes face-to-face)  [] RE-EVAL   [x] TS(44941) x 2   [] IONTO  [] NMR (74239) x    [] VASO  [x] Manual (11681) x   1   [] Other: Cold Pack  [] TA x      [] Mech Traction (25741)  [] ES(attended) (11569)      [] ES (un) (01949):     GOALS: updated 7/25/23  Patient stated goal: Pt wants to return to prior level of function including swimming, pickelball, walking, and social events through reducing pain and becoming stronger, following surgery. [] Progressing: [] Met: [] Not Met: [] Adjusted     Therapist goals for Patient:   Short Term Goals: To be achieved in: 2 weeks  1. Independent in HEP and progression per patient tolerance, in order to prevent re-injury. [] Progressing: [] Met: [] Not Met: [] Adjusted   2. Patient will have a decrease in pain to facilitate improvement in movement, function, and ADLs as indicated by Functional Deficits. [] Progressing: [] Met: [] Not Met: [] Adjusted     Long Term Goals: To be achieved in: 8 weeks PO  1. Disability index score of <55 % deficit on LEFS to assist with reaching prior level of function. [] Progressing: [] Met: [] Not Met: [] Adjusted  2. Patient will demonstrate increased AROM to 120 deg to allow for proper joint functioning as indicated by patients Functional Deficits. [] Progressing: [] Met: [] Not Met: [] Adjusted  3. Patient will demonstrate an increase in Strength to good proximal hip strength and control in LE to allow for proper functional mobility as indicated by patients Functional Deficits. [] Progressing: [] Met: [] Not Met: [] Adjusted  4. Patient will return to household functional activities without increased symptoms or restriction. [] Progressing: [] Met: [] Not Met: [] Adjusted  5. Patient will be able to ambulate household distances without AD while maintaining proper gait mechanics.     [] Progressing: [] Met: [] Not Met: []

## 2023-09-02 DIAGNOSIS — Z96.651 S/P TKR (TOTAL KNEE REPLACEMENT) USING CEMENT, RIGHT: Primary | ICD-10-CM

## 2023-09-02 DIAGNOSIS — Z96.651 S/P TKR (TOTAL KNEE REPLACEMENT) USING CEMENT, RIGHT: ICD-10-CM

## 2023-09-02 RX ORDER — HYDROMORPHONE HYDROCHLORIDE 2 MG/1
2 TABLET ORAL EVERY 4 HOURS PRN
Qty: 30 TABLET | Refills: 0 | Status: SHIPPED | OUTPATIENT
Start: 2023-09-02 | End: 2023-09-02 | Stop reason: CLARIF

## 2023-09-02 RX ORDER — HYDROMORPHONE HYDROCHLORIDE 2 MG/1
2 TABLET ORAL EVERY 4 HOURS PRN
Qty: 30 TABLET | Refills: 0 | Status: SHIPPED | OUTPATIENT
Start: 2023-09-02 | End: 2023-09-07

## 2023-09-05 ENCOUNTER — HOSPITAL ENCOUNTER (OUTPATIENT)
Dept: PHYSICAL THERAPY | Age: 63
Setting detail: THERAPIES SERIES
Discharge: HOME | End: 2023-09-05
Payer: COMMERCIAL

## 2023-09-05 PROCEDURE — 97140 MANUAL THERAPY 1/> REGIONS: CPT | Performed by: PHYSICAL THERAPY ASSISTANT

## 2023-09-05 PROCEDURE — 97110 THERAPEUTIC EXERCISES: CPT | Performed by: PHYSICAL THERAPY ASSISTANT

## 2023-09-05 NOTE — FLOWSHEET NOTE
EVAL (LOW) 62375 (typically 20 minutes face-to-face)  [] EVAL (MOD) 39968 (typically 30 minutes face-to-face)  [] EVAL (HIGH) 13424 (typically 45 minutes face-to-face)  [] RE-EVAL   [x] DJ(07348) x 2   [] IONTO  [] NMR (28254) x    [] VASO  [x] Manual (86511) x   1   [] Other: Cold Pack  [] TA x      [] Mech Traction (10677)  [] ES(attended) (25054)      [] ES (un) (36456):     GOALS: updated 7/25/23  Patient stated goal: Pt wants to return to prior level of function including swimming, pickelball, walking, and social events through reducing pain and becoming stronger, following surgery. [] Progressing: [] Met: [] Not Met: [] Adjusted     Therapist goals for Patient:   Short Term Goals: To be achieved in: 2 weeks  1. Independent in HEP and progression per patient tolerance, in order to prevent re-injury. [] Progressing: [] Met: [] Not Met: [] Adjusted   2. Patient will have a decrease in pain to facilitate improvement in movement, function, and ADLs as indicated by Functional Deficits. [] Progressing: [] Met: [] Not Met: [] Adjusted     Long Term Goals: To be achieved in: 8 weeks PO  1. Disability index score of <55 % deficit on LEFS to assist with reaching prior level of function. [] Progressing: [] Met: [] Not Met: [] Adjusted  2. Patient will demonstrate increased AROM to 120 deg to allow for proper joint functioning as indicated by patients Functional Deficits. [] Progressing: [] Met: [] Not Met: [] Adjusted  3. Patient will demonstrate an increase in Strength to good proximal hip strength and control in LE to allow for proper functional mobility as indicated by patients Functional Deficits. [] Progressing: [] Met: [] Not Met: [] Adjusted  4. Patient will return to household functional activities without increased symptoms or restriction. [] Progressing: [] Met: [] Not Met: [] Adjusted  5. Patient will be able to ambulate household distances without AD while maintaining proper gait mechanics.

## 2023-09-07 ENCOUNTER — HOSPITAL ENCOUNTER (OUTPATIENT)
Dept: PHYSICAL THERAPY | Age: 63
Setting detail: THERAPIES SERIES
Discharge: HOME | End: 2023-09-07
Payer: COMMERCIAL

## 2023-09-07 PROCEDURE — 97110 THERAPEUTIC EXERCISES: CPT | Performed by: PHYSICAL THERAPY ASSISTANT

## 2023-09-07 PROCEDURE — 97140 MANUAL THERAPY 1/> REGIONS: CPT | Performed by: PHYSICAL THERAPY ASSISTANT

## 2023-09-07 NOTE — FLOWSHEET NOTE
[facility] and [address] [place] [address] [address],  [facility], [address] [place] [address]  [address]  Phone: [phone]  Fax: [phone]        Physical Therapy Daily Treatment Note  Date:  2023    Patient Name:  Melania March    :  1960  MRN: [id number]  Restrictions/Precautions:    Medical/Treatment Diagnosis Information:  Knee pain [M25.569]  Treatment Diagnosis: OA (R) (M17.11), Stiffness (R) (M25.661), Pain (R) (M25.561), Difficulty Walking (R26.2), Gait Abnormality (R26.9), Weakness (R53.1)  R TKR/lateral release 23  Meds:  Insurance/Certification information:  PT Insurance Information: Medical [place] 20 visits HARD MAX NO AUTH  Physician Information:  Jessica Sotelo MD    Has the plan of care been signed (Y/N):        [x]  Yes  []  No     Date of Patient follow up with Physician: 1, 3, 6 and 12 weeks PO  8-3-23: seen by PA: med discussion  23: med discussion, review of ER report  8-10-23: med discussion, will prescribe mobic  23: seen in PT clinic -discussion for possibility of JAGUAR pending ROM progress, also seen in MD office for xray and 3 week check    Is this a Progress Report:     [x]  Yes  []  No        If Yes:  Date Range for reporting period:  Beginning 2023  Endin23    Progress report will be due (10 Rx or 30 days whichever is less): 3/97/55      Recertification will be due (POC Duration  / 90 days whichever is less): 10/25/23        Visit # Insurance Allowable Auth Required   14 20 (HARD MAX) []  Yes [x]  No          OUTCOME MEASURE DATE SCORE   LEFS 2023 Deficit: 70%   LEFS 23 pre-op Deficit: 75%       Latex Allergy:  [x]NO      []YES  Preferred Language for Healthcare:   [x]English       []other:    Pain level:  7/10    SUBJECTIVE:  6 weeks post op. Has been able to spread meds out to 7 hours.  States she has adjusted hep as discussed and knee seems to be doing a

## 2023-09-09 DIAGNOSIS — Z96.651 S/P TKR (TOTAL KNEE REPLACEMENT) USING CEMENT, RIGHT: Primary | ICD-10-CM

## 2023-09-09 RX ORDER — HYDROMORPHONE HYDROCHLORIDE 2 MG/1
2 TABLET ORAL EVERY 4 HOURS PRN
Qty: 30 TABLET | Refills: 0 | Status: SHIPPED | OUTPATIENT
Start: 2023-09-09 | End: 2023-09-09 | Stop reason: CLARIF

## 2023-09-09 RX ORDER — HYDROMORPHONE HYDROCHLORIDE 2 MG/1
2 TABLET ORAL EVERY 4 HOURS PRN
Qty: 30 TABLET | Refills: 0 | Status: SHIPPED | OUTPATIENT
Start: 2023-09-09 | End: 2023-09-14

## 2023-09-09 NOTE — PROGRESS NOTES
[address]  Knee Pain    Date:  2023    Name:  Jeremy Diehl  Address:  [address] [address]    :  1960      Age:   59 y.o. Chief Complaint: Right knee pain    History of Present Illness:  Jeremy Diehl is a 59 y.o. female s/p a right TKR. At this time the patient is receiving pain medicine from the orthopedic surgery team (Dr. Steph Saldaña and myself) for postop surgical pain. Pain medication is being written on a weekly basis after a thorough discussion with the patient and reassessment of the patient's condition to verify that the patient is receiving the lowest possible dose to manage the pain. I spoke with the patient today (23) and the patient states that they are doing much better with pain. They have been able to make further strides with home therapy and notes that their swelling has decreased. The patient feels that their pain is decreasing and is noticing improvements daily. Patient denies any signs or symptoms of drowsiness, loss of balance, euphoric sensation, or other symptoms of overuse or misuse of narcotic pain medication. The patient is being written pain medication for this major orthopedic surgery. Patient was educated to on the appropriate utilization of narcotic pain medication. They were given appropriate precautions and restrictions that they should strictly adhere to while taking narcotic pain medication including not conducting any activity that could put them or others at risk of harm or death. Patient was understanding of all instructions and agreeable with this plan. Orders Placed This Encounter   Medications    HYDROmorphone (DILAUDID) 2 MG tablet     Sig: Take 1 tablet by mouth every 4 hours as needed for Pain for up to 5 days.  Max Daily Amount: 12 mg     Dispense:  30 tablet     Refill:  0     Reduce doses taken as pain becomes manageable         Patient being

## 2023-09-12 ENCOUNTER — HOSPITAL ENCOUNTER (OUTPATIENT)
Dept: PHYSICAL THERAPY | Age: 63
Setting detail: THERAPIES SERIES
Discharge: HOME | End: 2023-09-12
Payer: COMMERCIAL

## 2023-09-12 PROCEDURE — 97140 MANUAL THERAPY 1/> REGIONS: CPT | Performed by: PHYSICAL THERAPY ASSISTANT

## 2023-09-12 PROCEDURE — 97110 THERAPEUTIC EXERCISES: CPT | Performed by: PHYSICAL THERAPY ASSISTANT

## 2023-09-12 NOTE — FLOWSHEET NOTE
[facility] and [address] [place] [address] [address],  [facility], [address] [place] [address]  200 [facility], [address] [place] [address] Avenue  Phone: [phone]  Fax: [phone]        Physical Therapy Daily Treatment Note  Date:  2023    Patient Name:  Elizabeth Juarez    :  1960  MRN: [id number]  Restrictions/Precautions:    Medical/Treatment Diagnosis Information:  Knee pain [M25.569]  Treatment Diagnosis: OA (R) (M17.11), Stiffness (R) (M25.661), Pain (R) (M25.561), Difficulty Walking (R26.2), Gait Abnormality (R26.9), Weakness (R53.1)  R TKR/lateral release 23  Meds:  Insurance/Certification information:  PT Insurance Information: Medical [place] 20 visits HARD MAX NO AUTH  Physician Information:  Rosa Marcos MD    Has the plan of care been signed (Y/N):        [x]  Yes  []  No     Date of Patient follow up with Physician: 1, 3, 6 and 12 weeks PO  8-3-23: seen by PA: med discussion  23: med discussion, review of ER report  8-10-23: med discussion, will prescribe mobic  23: seen in PT clinic -discussion for possibility of JAGUAR pending ROM progress, also seen in MD office for xray and 3 week check    Is this a Progress Report:     [x]  Yes  []  No        If Yes:  Date Range for reporting period:  Beginning 2023  Endin23    Progress report will be due (10 Rx or 30 days whichever is less):       Recertification will be due (POC Duration  / 90 days whichever is less): 10/25/23        Visit # Insurance Allowable Auth Required   15 20 (HARD MAX) []  Yes [x]  No          OUTCOME MEASURE DATE SCORE   LEFS 2023 Deficit: 70%   LEFS 23 pre-op Deficit: 75%   LEFS NPV        Latex Allergy:  [x]NO      []YES  Preferred Language for Healthcare:   [x]English       []other:    Pain level:  7/10    SUBJECTIVE:  7 weeks post op. Feels that change in hep has helped reduce symptoms. States she is weaning meds.  She is taking

## 2023-09-14 ENCOUNTER — HOSPITAL ENCOUNTER (OUTPATIENT)
Dept: PHYSICAL THERAPY | Age: 63
Setting detail: THERAPIES SERIES
Discharge: HOME | End: 2023-09-14
Payer: COMMERCIAL

## 2023-09-14 PROCEDURE — 97110 THERAPEUTIC EXERCISES: CPT | Performed by: PHYSICAL THERAPY ASSISTANT

## 2023-09-14 PROCEDURE — 97140 MANUAL THERAPY 1/> REGIONS: CPT | Performed by: PHYSICAL THERAPY ASSISTANT

## 2023-09-14 NOTE — FLOWSHEET NOTE
[facility] and [address] [place] [address] [address],  [facility], [address] [place] [address]  200 [facility], [address] [place] [address] Avenue  Phone: [phone]  Fax: [phone]        Physical Therapy Daily Treatment Note  Date:  2023    Patient Name:  Antonieta García    :  1960  MRN: [id number]  Restrictions/Precautions:    Medical/Treatment Diagnosis Information:  Knee pain [M25.569]  Treatment Diagnosis: OA (R) (M17.11), Stiffness (R) (M25.661), Pain (R) (M25.561), Difficulty Walking (R26.2), Gait Abnormality (R26.9), Weakness (R53.1)  R TKR/lateral release 23  Meds:  Insurance/Certification information:  PT Insurance Information: Medical [place] 20 visits HARD MAX NO AUTH  Physician Information:  Elvira Ng MD    Has the plan of care been signed (Y/N):        [x]  Yes  []  No     Date of Patient follow up with Physician: 1, 3, 6 and 12 weeks PO  8-3-23: seen by PA: med discussion  23: med discussion, review of ER report  8-10-23: med discussion, will prescribe mobic  23: seen in PT clinic -discussion for possibility of JAGUAR pending ROM progress, also seen in MD office for xray and 3 week check    Is this a Progress Report:     []  Yes  [x]  No        If Yes:  Date Range for reporting period:  Beginning 2023  Endin23    Progress report will be due (10 Rx or 30 days whichever is less):       Recertification will be due (POC Duration  / 90 days whichever is less): 10/25/23        Visit # Insurance Allowable Auth Required   16 20 (HARD MAX) []  Yes [x]  No          OUTCOME MEASURE DATE SCORE   LEFS 2023 Deficit: 70%   LEFS 23 pre-op Deficit: 75%   LEFS 23 Deficit: 70%       Latex Allergy:  [x]NO      []YES  Preferred Language for Healthcare:   [x]English       []other:    Pain level:  7/10    SUBJECTIVE:  7 weeks post op. Did ok after last visit and program progressions.        OBJECTIVE:    23

## 2023-09-19 ENCOUNTER — HOSPITAL ENCOUNTER (OUTPATIENT)
Dept: PHYSICAL THERAPY | Age: 63
Setting detail: THERAPIES SERIES
Discharge: HOME | End: 2023-09-19
Payer: COMMERCIAL

## 2023-09-19 ENCOUNTER — OFFICE VISIT (OUTPATIENT)
Dept: ORTHOPEDIC SURGERY | Age: 63
End: 2023-09-19

## 2023-09-19 DIAGNOSIS — Z96.651 S/P TKR (TOTAL KNEE REPLACEMENT) USING CEMENT, RIGHT: Primary | ICD-10-CM

## 2023-09-19 DIAGNOSIS — Z09 POSTOP CHECK: ICD-10-CM

## 2023-09-19 PROCEDURE — 99024 POSTOP FOLLOW-UP VISIT: CPT | Performed by: ORTHOPAEDIC SURGERY

## 2023-09-19 PROCEDURE — 97140 MANUAL THERAPY 1/> REGIONS: CPT | Performed by: PHYSICAL THERAPY ASSISTANT

## 2023-09-19 PROCEDURE — 97530 THERAPEUTIC ACTIVITIES: CPT | Performed by: PHYSICAL THERAPY ASSISTANT

## 2023-09-19 PROCEDURE — 97110 THERAPEUTIC EXERCISES: CPT | Performed by: PHYSICAL THERAPY ASSISTANT

## 2023-09-19 NOTE — PROGRESS NOTES
Chief Complaint    Knee Pain (F/u R knee sx 7/26)      History of Present Illness:  Rehana Roman is a 59 y.o. female who presents for follow up of right knee(s). Patient is status post 7 weeks R TKR on 7/26/2023. She reports she has been doing well overall but still has to work to manage her pain. She uses ice, massage, and medication for pain management. Her pain gets up to 5/10 with activity. She has been working closely with PT to regain her motion and strength. She has a PT follow up visit after her clinic visit today. She is doing PT once a week now and does an exercise routine at the gym 3 times a week. She reports she is sleeping better now as well. Patients reports she has to be careful with medications due to a kidney disease. Pain Assessment:  Location: right  Level: 4 out of 10  Duration: Hours  Description: sharp  Result of an injury: No  Work related injury: No  Aggravating actions: walking  Relieving Factors: rest, ice, and OTC medications  Wants injections: No     Past Medical History:   Diagnosis Date    Asthma     Chronic back pain     Histoplasmosis     Kidney disease     PT STATES LOSING PROTEIN, BEEN FINE FOR 25 YRS        Past Surgical History:   Procedure Laterality Date    BACK SURGERY      FUSED 4/23    BUNIONECTOMY      OTHER SURGICAL HISTORY      PT STATES 13 TOTAL SURGERIES, WON'T GO INTO DETAIL    SHOULDER SURGERY      3 ROTATOR CUFF SURGERIES    TOTAL KNEE ARTHROPLASTY Right 7/26/2023    RIGHT TOTAL KNEE REPLACEMENT performed by rUiah Silva MD at [facility] OR       No family history on file.     Social History     Socioeconomic History    Marital status:    Tobacco Use    Smoking status: Former    Smokeless tobacco: Never   Substance and Sexual Activity    Alcohol use: Yes     Comment: 2-3 DRINKS A WEEK, AWARE NOT TO DRINK DAY BEFORE OR DOS-DG    Drug use: Not Currently       Current Outpatient Medications   Medication Sig Dispense Refill    meloxicam (MOBIC) 15 MG tablet

## 2023-09-20 ENCOUNTER — HOSPITAL ENCOUNTER (OUTPATIENT)
Dept: PULMONOLOGY | Age: 63
Discharge: HOME | End: 2023-09-20
Attending: INTERNAL MEDICINE
Payer: COMMERCIAL

## 2023-09-20 DIAGNOSIS — J45.909 UNCOMPLICATED ASTHMA, UNSPECIFIED ASTHMA SEVERITY, UNSPECIFIED WHETHER PERSISTENT: ICD-10-CM

## 2023-09-20 PROCEDURE — 94726 PLETHYSMOGRAPHY LUNG VOLUMES: CPT

## 2023-09-20 PROCEDURE — 94729 DIFFUSING CAPACITY: CPT

## 2023-09-20 PROCEDURE — 94060 EVALUATION OF WHEEZING: CPT

## 2023-09-20 PROCEDURE — 6360000002 HC RX W HCPCS: Performed by: INTERNAL MEDICINE

## 2023-09-20 PROCEDURE — 94664 DEMO&/EVAL PT USE INHALER: CPT

## 2023-09-20 PROCEDURE — 94760 N-INVAS EAR/PLS OXIMETRY 1: CPT

## 2023-09-20 RX ORDER — ALBUTEROL SULFATE 2.5 MG/3ML
2.5 SOLUTION RESPIRATORY (INHALATION) ONCE
Status: COMPLETED | OUTPATIENT
Start: 2023-09-20 | End: 2023-09-20

## 2023-09-20 RX ADMIN — ALBUTEROL SULFATE 2.5 MG: 2.5 SOLUTION RESPIRATORY (INHALATION) at 15:59

## 2023-09-21 ENCOUNTER — TELEPHONE (OUTPATIENT)
Dept: ORTHOPEDIC SURGERY | Age: 63
End: 2023-09-21

## 2023-09-21 NOTE — TELEPHONE ENCOUNTER
PATIENT NEEDS A REFILL ON PRESCRIPTION AND ASKED THAT THE PRESCRIPTION BE SENT TO THE [facility]'S ON [address].

## 2023-09-23 DIAGNOSIS — Z96.651 S/P TKR (TOTAL KNEE REPLACEMENT) USING CEMENT, RIGHT: Primary | ICD-10-CM

## 2023-09-23 RX ORDER — HYDROMORPHONE HYDROCHLORIDE 2 MG/1
2 TABLET ORAL EVERY 4 HOURS PRN
Qty: 30 TABLET | Refills: 0 | Status: SHIPPED | OUTPATIENT
Start: 2023-09-23 | End: 2023-09-28

## 2023-09-26 ENCOUNTER — HOSPITAL ENCOUNTER (OUTPATIENT)
Dept: PHYSICAL THERAPY | Age: 63
Setting detail: THERAPIES SERIES
Discharge: HOME | End: 2023-09-26
Payer: COMMERCIAL

## 2023-09-26 PROCEDURE — 97110 THERAPEUTIC EXERCISES: CPT | Performed by: PHYSICAL THERAPY ASSISTANT

## 2023-09-26 PROCEDURE — 97140 MANUAL THERAPY 1/> REGIONS: CPT | Performed by: PHYSICAL THERAPY ASSISTANT

## 2023-09-26 PROCEDURE — 97112 NEUROMUSCULAR REEDUCATION: CPT | Performed by: PHYSICAL THERAPY ASSISTANT

## 2023-09-26 NOTE — FLOWSHEET NOTE
20045 (typically 20 minutes face-to-face)  [] EVAL (MOD) 69824 (typically 30 minutes face-to-face)  [] EVAL (HIGH) 62554 (typically 45 minutes face-to-face)  [] RE-EVAL   [x] QP(06675) x 1   [] IONTO  [x] NMR (75939) x 1   [] VASO  [x] Manual (23604) x   1   [] Other: Cold Pack  [] TA x      [] Mech Traction (03446)  [] ES(attended) (67093)      [] ES (un) (81838):     GOALS: updated 7/25/23  Patient stated goal: Pt wants to return to prior level of function including swimming, pickelball, walking, and social events through reducing pain and becoming stronger, following surgery. [] Progressing: [] Met: [] Not Met: [] Adjusted     Therapist goals for Patient:   Short Term Goals: To be achieved in: 2 weeks  1. Independent in HEP and progression per patient tolerance, in order to prevent re-injury. [] Progressing: [] Met: [] Not Met: [] Adjusted   2. Patient will have a decrease in pain to facilitate improvement in movement, function, and ADLs as indicated by Functional Deficits. [] Progressing: [] Met: [] Not Met: [] Adjusted     Long Term Goals: To be achieved in: 8 weeks PO  1. Disability index score of <55 % deficit on LEFS to assist with reaching prior level of function. [] Progressing: [] Met: [] Not Met: [] Adjusted  2. Patient will demonstrate increased AROM to 120 deg to allow for proper joint functioning as indicated by patients Functional Deficits. [] Progressing: [] Met: [] Not Met: [] Adjusted  3. Patient will demonstrate an increase in Strength to good proximal hip strength and control in LE to allow for proper functional mobility as indicated by patients Functional Deficits. [] Progressing: [] Met: [] Not Met: [] Adjusted  4. Patient will return to household functional activities without increased symptoms or restriction. [] Progressing: [] Met: [] Not Met: [] Adjusted  5. Patient will be able to ambulate household distances without AD while maintaining proper gait mechanics.     []

## 2023-09-28 ENCOUNTER — OFFICE VISIT (OUTPATIENT)
Dept: PULMONOLOGY | Age: 63
End: 2023-09-28
Payer: COMMERCIAL

## 2023-09-28 VITALS
OXYGEN SATURATION: 99 % | HEIGHT: 62 IN | BODY MASS INDEX: 32.2 KG/M2 | DIASTOLIC BLOOD PRESSURE: 76 MMHG | HEART RATE: 83 BPM | WEIGHT: 175 LBS | SYSTOLIC BLOOD PRESSURE: 118 MMHG

## 2023-09-28 DIAGNOSIS — J45.40 MODERATE PERSISTENT ASTHMA WITHOUT COMPLICATION: Primary | ICD-10-CM

## 2023-09-28 DIAGNOSIS — G47.33 OSA ON CPAP: ICD-10-CM

## 2023-09-28 PROCEDURE — 99204 OFFICE O/P NEW MOD 45 MIN: CPT | Performed by: INTERNAL MEDICINE

## 2023-09-28 PROCEDURE — G8417 CALC BMI ABV UP PARAM F/U: HCPCS | Performed by: INTERNAL MEDICINE

## 2023-09-28 PROCEDURE — G8427 DOCREV CUR MEDS BY ELIG CLIN: HCPCS | Performed by: INTERNAL MEDICINE

## 2023-09-28 PROCEDURE — 3017F COLORECTAL CA SCREEN DOC REV: CPT | Performed by: INTERNAL MEDICINE

## 2023-09-28 PROCEDURE — 1036F TOBACCO NON-USER: CPT | Performed by: INTERNAL MEDICINE

## 2023-09-28 NOTE — PROGRESS NOTES
[facility] Pulmonary and Critical Care    Outpatient Initial Note    Subjective:   Referring Physician: Self  CHIEF COMPLAINT / HPI:     The patient is 59 y.o. female who presents today for a new patient visit for asthma. Patient brought her cell phone to the office because she had a recent knee replacement and following knee replacement was having issues with shortness of breath. She has not returned back to her baseline. She is on Advair discus 250s and has been on that dose for a long time. She says she was on the 100s at one point but does not remember when it was switched. She previously was followed through the [facility] system. She had pulmonary function test prior to the visit which we went over. She has sleep apnea and uses a CPAP. She does not like the CPAP but when she uses it she never wakes up short of breath. She does not remember the last time she had to use her rescue inhaler. She is physically bit limited by the recent knee replacement. 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 The description of the illness is kind of weird and she said she had an extensive work-up and a lung biopsy and pointed to a scar just to the left of her sternum where they had to put a big chest tube. Very unusual place for chest tube. Past Medical History:    Past Medical History:   Diagnosis Date    Asthma     Chronic back pain     Histoplasmosis     Kidney disease     PT STATES LOSING PROTEIN, BEEN FINE FOR 22 YRS       Social History:    Social History     Tobacco Use   Smoking Status Former   Smokeless Tobacco Never       Family History:  No family history on file. Current Medications:  Current Outpatient Medications on File Prior to Visit   Medication Sig Dispense Refill    HYDROmorphone (DILAUDID) 2 MG tablet Take 1 tablet by mouth every 4 hours as needed for Pain for up to 5 days.  Max Daily Amount: 12 mg 30 tablet 0    meloxicam (MOBIC) 15 MG

## 2023-09-29 PROBLEM — J45.909 UNCOMPLICATED ASTHMA: Status: ACTIVE | Noted: 2023-09-29

## 2023-10-03 ENCOUNTER — HOSPITAL ENCOUNTER (OUTPATIENT)
Dept: PHYSICAL THERAPY | Age: 63
Setting detail: THERAPIES SERIES
Discharge: HOME | End: 2023-10-03
Payer: COMMERCIAL

## 2023-10-03 PROCEDURE — 97016 VASOPNEUMATIC DEVICE THERAPY: CPT | Performed by: PHYSICAL THERAPY ASSISTANT

## 2023-10-03 PROCEDURE — 97140 MANUAL THERAPY 1/> REGIONS: CPT | Performed by: PHYSICAL THERAPY ASSISTANT

## 2023-10-03 PROCEDURE — 97110 THERAPEUTIC EXERCISES: CPT | Performed by: PHYSICAL THERAPY ASSISTANT

## 2023-10-03 PROCEDURE — 97112 NEUROMUSCULAR REEDUCATION: CPT | Performed by: PHYSICAL THERAPY ASSISTANT

## 2023-10-03 NOTE — FLOWSHEET NOTE
[facility] and [address] [place] [address] [address],  Sports Performance and Rehabilitation, [address] [place] [address], [address] [place] [address] Avenue  Phone: [phone]  Fax: [phone]        Physical Therapy Daily Treatment Note  Date:  10/3/2023    Patient Name:  Ander Oakley    :  1960  MRN: [id number]  Restrictions/Precautions:    Medical/Treatment Diagnosis Information:  Knee pain [M25.569]  Treatment Diagnosis: OA (R) (M17.11), Stiffness (R) (M25.661), Pain (R) (M25.561), Difficulty Walking (R26.2), Gait Abnormality (R26.9), Weakness (R53.1)  R TKR/lateral release 23  Meds:  Insurance/Certification information:  PT Insurance Information: Medical [place] 20 visits HARD MAX NO AUTH  Physician Information:  Kylah Azul MD    Has the plan of care been signed (Y/N):        [x]  Yes  []  No     Date of Patient follow up with Physician: 1, 3, 6 and 12 weeks PO  8-3-23: seen by PA: med discussion  23: med discussion, review of ER report  8-10-23: med discussion, will prescribe mobic  23: seen in PT clinic -discussion for possibility of JAGUAR pending ROM progress, also seen in MD office for xray and 3 week check    Is this a Progress Report:     []  Yes  [x]  No        If Yes:  Date Range for reporting period:  Beginning 2023  Endin23    Progress report will be due (10 Rx or 30 days whichever is less):       Recertification will be due (POC Duration  / 90 days whichever is less): 10/25/23        Visit # Insurance Allowable Auth Required   19 20 (HARD MAX) []  Yes [x]  No          OUTCOME MEASURE DATE SCORE   LEFS 2023 Deficit: 70%   LEFS 23 pre-op Deficit: 75%   LEFS 23 Deficit: 70%       Latex Allergy:  [x]NO      []YES  Preferred Language for Healthcare:   [x]English       []other:    Pain level:  7/10    SUBJECTIVE:  9 weeks post op. Knee is improving although still with stiffness and pain.  Has been going to [facility]

## 2023-10-17 ENCOUNTER — HOSPITAL ENCOUNTER (OUTPATIENT)
Dept: PHYSICAL THERAPY | Age: 63
Setting detail: THERAPIES SERIES
Discharge: HOME | End: 2023-10-17
Payer: COMMERCIAL

## 2023-10-17 PROCEDURE — 97750 PHYSICAL PERFORMANCE TEST: CPT | Performed by: PHYSICAL THERAPIST

## 2023-10-17 PROCEDURE — 97110 THERAPEUTIC EXERCISES: CPT | Performed by: PHYSICAL THERAPIST

## 2023-10-17 PROCEDURE — 97530 THERAPEUTIC ACTIVITIES: CPT | Performed by: PHYSICAL THERAPIST

## 2023-10-17 PROCEDURE — 97016 VASOPNEUMATIC DEVICE THERAPY: CPT | Performed by: PHYSICAL THERAPIST

## 2023-10-17 NOTE — PLAN OF CARE
[facility] and [address] [place] [address] [address],  Sports Performance and Rehabilitation, [address] 201 [facility]  [address]  Phone: [phone]  Fax: [phone]      Physical Therapy Re-Certification Plan of Care    Dear Pedro Ramos MD  ,    We had the pleasure of treating the following patient for physical therapy services at [address] [place] [address]. A summary of our findings can be found in the updated assessment below. This includes our plan of care. If you have any questions or concerns regarding these findings, please do not hesitate to contact me at the office phone number checked above. Thank you for the referral.     Physician Signature:________________________________Date:__________________  By signing above (or electronic signature), therapist's plan is approved by physician        Overall Response to Treatment:   [x]Patient is responding well to treatment and improvement is noted with regards to goals   []Patient should continue to improve in reasonable time if they continue HEP   []Patient has plateaued and is no longer responding to skilled PT intervention    []Patient is getting worse and would benefit from return to referring MD   []Patient unable to adhere to initial POC   []Other: Total Visits: 20     Recommendation:    [x] Continue PT 1x / 2 wks for 6 weeks. [] Hold PT, pending MD visit   [] Discharge to HEP. Follow up with PT or MD PRN.      Physical Therapy Daily Treatment Note  Date:  10/17/2023    Patient Name:  Marjorie Bolton    :  1960  MRN: [id number]  Restrictions/Precautions:    Medical/Treatment Diagnosis Information:  Knee pain [M25.569]  Treatment Diagnosis: OA (R) (M17.11), Stiffness (R) (M25.661), Pain (R) (M25.561), Difficulty Walking (R26.2), Gait Abnormality (R26.9), Weakness (R53.1)  R TKR/lateral release 23  Meds:  Insurance/Certification information:  PT Insurance Information:

## 2023-10-31 ENCOUNTER — APPOINTMENT (OUTPATIENT)
Dept: PHYSICAL THERAPY | Age: 63
End: 2023-10-31
Payer: COMMERCIAL

## 2023-11-07 ENCOUNTER — APPOINTMENT (OUTPATIENT)
Dept: PHYSICAL THERAPY | Age: 63
End: 2023-11-07
Payer: COMMERCIAL

## 2023-11-09 ENCOUNTER — HOSPITAL ENCOUNTER (OUTPATIENT)
Dept: PHYSICAL THERAPY | Age: 63
Setting detail: THERAPIES SERIES
Discharge: HOME | End: 2023-11-09

## 2023-11-09 PROCEDURE — 97110 THERAPEUTIC EXERCISES: CPT | Performed by: PHYSICAL THERAPY ASSISTANT

## 2023-11-09 NOTE — FLOWSHEET NOTE
coordination, kinesthetic sense, posture, motor skill, proprioception and motor activation to allow for proper function of core, proximal hip and LE with self care and ADLs  [] (87349) Gait Re-education- Provided training and instruction to the patient for proper LE, core and proximal hip recruitment and positioning and eccentric body weight control with ambulation re-education including up and down stairs     Home Exercise Program:    [x] (23939) Reviewed/Progressed HEP activities related to strengthening, flexibility, endurance, ROM of core, proximal hip and LE for functional self-care, mobility, lifting and ambulation/stair navigation   [x] (92566)Reviewed/Progressed HEP activities related to improving balance, coordination, kinesthetic sense, posture, motor skill, proprioception of core, proximal hip and LE for self care, mobility, lifting, and ambulation/stair navigation      Manual Treatments:  PROM / STM / Oscillations-Mobs:  G-I, II, III, IV (PA's, Inf., Post.)  [] (58522) Provided manual therapy to mobilize LE, proximal hip and/or LS spine soft tissue/joints for the purpose of modulating pain, promoting relaxation,  increasing ROM, reducing/eliminating soft tissue swelling/inflammation/restriction, improving soft tissue extensibility and allowing for proper ROM for normal function with self care, mobility, lifting and ambulation.      Modalities: CP x10'    Charges:   Timed Code Treatment Minutes: 35'   Total Treatment Minutes: 4:00- 4:35  35'       [] EVAL (LOW) 51467 (typically 20 minutes face-to-face)  [] EVAL (MOD) 05143 (typically 30 minutes face-to-face)  [] EVAL (HIGH) 80855 (typically 45 minutes face-to-face)  [] RE-EVAL   [x] SJ(25844) x 2   [] IONTO  [] NMR (35386) x    [] VASO  [] Manual (67995) x      [] Other: PPT x1  [] TA x      [] Mech Traction (19348)  [] ES(attended) (84430)      [] ES (un) (37740):     GOALS: 10/17/23  Patient stated goal: Pt wants to return to prior level of function

## 2023-12-07 ENCOUNTER — HOSPITAL ENCOUNTER (OUTPATIENT)
Dept: PHYSICAL THERAPY | Age: 63
Setting detail: THERAPIES SERIES
Discharge: HOME | End: 2023-12-07

## 2023-12-07 PROCEDURE — 97110 THERAPEUTIC EXERCISES: CPT | Performed by: PHYSICAL THERAPY ASSISTANT

## 2023-12-07 PROCEDURE — 97110 THERAPEUTIC EXERCISES: CPT | Performed by: PHYSICAL THERAPIST

## 2023-12-07 NOTE — PLAN OF CARE
[facility] and [address] [place] [address] [address],  Sports Performance and Rehabilitation, [address] 201 [facility]  [address]  Phone: [phone]  Fax: [phone]    Physical Therapy Re-Certification Plan of Care    Dear Pako Saravia MD  ,    We had the pleasure of treating the following patient for physical therapy services at [address] [place] [address]. A summary of our findings can be found in the updated assessment below. This includes our plan of care. If you have any questions or concerns regarding these findings, please do not hesitate to contact me at the office phone number checked above. Thank you for the referral.     Physician Signature:________________________________Date:__________________  By signing above (or electronic signature), therapist's plan is approved by physician        Overall Response to Treatment:   [x]Patient is responding well to treatment and improvement is noted with regards to goals   []Patient should continue to improve in reasonable time if they continue HEP   []Patient has plateaued and is no longer responding to skilled PT intervention    []Patient is getting worse and would benefit from return to referring MD   []Patient unable to adhere to initial POC   [x]Other: return to clinic at 6 month post-op son for re-assessment. Total Visits: 22     Recommendation:    [] Continue PT x / wk for  weeks. [] Hold PT, pending MD visit   [] Discharge to [facility]. Follow up with PT or MD PRN.        Physical Therapy Daily Treatment Note  Date:  2023    Patient Name:  Belinda Tirado    :  1960  MRN: [id number]  Restrictions/Precautions:    Medical/Treatment Diagnosis Information:  Knee pain [M25.569]  Treatment Diagnosis: OA (R) (M17.11), Stiffness (R) (M25.661), Pain (R) (M25.561), Difficulty Walking (R26.2), Gait Abnormality (R26.9), Weakness (R53.1)  R TKR/lateral release

## 2024-01-29 ENCOUNTER — HOSPITAL ENCOUNTER (OUTPATIENT)
Dept: PHYSICAL THERAPY | Age: 64
Setting detail: THERAPIES SERIES
Discharge: HOME | End: 2024-01-29
Payer: COMMERCIAL

## 2024-01-29 PROCEDURE — 97750 PHYSICAL PERFORMANCE TEST: CPT | Performed by: PHYSICAL THERAPIST

## 2024-01-29 PROCEDURE — 97110 THERAPEUTIC EXERCISES: CPT | Performed by: PHYSICAL THERAPIST

## 2024-01-29 PROCEDURE — 97530 THERAPEUTIC ACTIVITIES: CPT | Performed by: PHYSICAL THERAPIST

## 2024-01-29 NOTE — PLAN OF CARE
The [facility] - Orthopaedic and Sports Medicine [place],  Sports Performance and Rehabilitation, [address] [id number]  Phone: [phone]  Fax: [phone]    Physical Therapy Re-Certification Plan of Care    Dear Gianfranco Rosales MD  ,    We had the pleasure of treating the following patient for physical therapy services at [facility]. A summary of our findings can be found in the updated assessment below.  This includes our plan of care.  If you have any questions or concerns regarding these findings, please do not hesitate to contact me at the office phone number checked above.  Thank you for the referral.     Physician Signature:________________________________Date:__________________  By signing above (or electronic signature), therapist's plan is approved by physician        Overall Response to Treatment:   [x]Patient is responding well to treatment and improvement is noted with regards to goals   []Patient should continue to improve in reasonable time if they continue HEP   []Patient has plateaued and is no longer responding to skilled PT intervention    []Patient is getting worse and would benefit from return to referring MD   []Patient unable to adhere to initial POC   [x]Other: return at 1 year post-op son for re-assessment    Total Visits: 23     Recommendation:    [] Continue PT x / wk for  weeks.   [] Hold PT, pending MD visit   [] Discharge to [facility]. Follow up with PT or MD PRN.       Physical Therapy Daily Treatment Note  Date:  2024    Patient Name:  Landy Whitten    :  1960  MRN: [id number]  Restrictions/Precautions:    Medical/Treatment Diagnosis Information:  Knee pain [M25.569]  Treatment Diagnosis: OA (R) (M17.11), Stiffness (R) (M25.661), Pain (R) (M25.561), Difficulty Walking (R26.2), Gait Abnormality (R26.9), Weakness (R53.1)  R TKR/lateral release

## 2024-03-20 ENCOUNTER — HOSPITAL ENCOUNTER (OUTPATIENT)
Dept: WOMENS IMAGING | Age: 64
Discharge: HOME | End: 2024-03-20
Payer: COMMERCIAL

## 2024-03-20 VITALS — HEIGHT: 61 IN | WEIGHT: 172 LBS | BODY MASS INDEX: 32.47 KG/M2

## 2024-03-20 DIAGNOSIS — Z12.31 VISIT FOR SCREENING MAMMOGRAM: ICD-10-CM

## 2024-03-20 PROCEDURE — 77063 BREAST TOMOSYNTHESIS BI: CPT

## 2024-05-11 ENCOUNTER — HOSPITAL ENCOUNTER (EMERGENCY)
Age: 64
Discharge: HOME | End: 2024-05-11
Attending: EMERGENCY MEDICINE
Payer: COMMERCIAL

## 2024-05-11 ENCOUNTER — APPOINTMENT (OUTPATIENT)
Dept: CT IMAGING | Age: 64
End: 2024-05-11
Payer: COMMERCIAL

## 2024-05-11 ENCOUNTER — APPOINTMENT (OUTPATIENT)
Dept: GENERAL RADIOLOGY | Age: 64
End: 2024-05-11
Payer: COMMERCIAL

## 2024-05-11 VITALS
SYSTOLIC BLOOD PRESSURE: 152 MMHG | BODY MASS INDEX: 33.27 KG/M2 | OXYGEN SATURATION: 98 % | RESPIRATION RATE: 16 BRPM | HEIGHT: 61 IN | TEMPERATURE: 97.8 F | DIASTOLIC BLOOD PRESSURE: 91 MMHG | WEIGHT: 176.2 LBS | HEART RATE: 77 BPM

## 2024-05-11 DIAGNOSIS — R42 VERTIGO: Primary | ICD-10-CM

## 2024-05-11 DIAGNOSIS — G44.209 ACUTE NON INTRACTABLE TENSION-TYPE HEADACHE: ICD-10-CM

## 2024-05-11 LAB
ALBUMIN SERPL-MCNC: 4.6 G/DL (ref 3.4–5)
ALBUMIN/GLOB SERPL: 1.4 {RATIO} (ref 1.1–2.2)
ALP SERPL-CCNC: 97 U/L (ref 40–129)
ALT SERPL-CCNC: 12 U/L (ref 10–40)
ANION GAP SERPL CALCULATED.3IONS-SCNC: 13 MMOL/L (ref 3–16)
AST SERPL-CCNC: 18 U/L (ref 15–37)
BASOPHILS # BLD: 0 K/UL (ref 0–0.2)
BASOPHILS NFR BLD: 0.9 %
BILIRUB SERPL-MCNC: <0.2 MG/DL (ref 0–1)
BUN SERPL-MCNC: 20 MG/DL (ref 7–20)
CALCIUM SERPL-MCNC: 10.4 MG/DL (ref 8.3–10.6)
CHLORIDE SERPL-SCNC: 101 MMOL/L (ref 99–110)
CO2 SERPL-SCNC: 20 MMOL/L (ref 21–32)
CREAT SERPL-MCNC: 0.8 MG/DL (ref 0.6–1.2)
DEPRECATED RDW RBC AUTO: 13.4 % (ref 12.4–15.4)
EOSINOPHIL # BLD: 0.3 K/UL (ref 0–0.6)
EOSINOPHIL NFR BLD: 6.3 %
GFR SERPLBLD CREATININE-BSD FMLA CKD-EPI: 83 ML/MIN/{1.73_M2}
GLUCOSE BLD-MCNC: 116 MG/DL (ref 70–99)
GLUCOSE SERPL-MCNC: 125 MG/DL (ref 70–99)
HCG SERPL QL: NEGATIVE
HCT VFR BLD AUTO: 42.2 % (ref 36–48)
HGB BLD-MCNC: 14.2 G/DL (ref 12–16)
INR PPP: 0.96 (ref 0.85–1.15)
LYMPHOCYTES # BLD: 1.9 K/UL (ref 1–5.1)
LYMPHOCYTES NFR BLD: 36.3 %
MCH RBC QN AUTO: 29.8 PG (ref 26–34)
MCHC RBC AUTO-ENTMCNC: 33.5 G/DL (ref 31–36)
MCV RBC AUTO: 88.9 FL (ref 80–100)
MONOCYTES # BLD: 0.5 K/UL (ref 0–1.3)
MONOCYTES NFR BLD: 9.1 %
NEUTROPHILS # BLD: 2.5 K/UL (ref 1.7–7.7)
NEUTROPHILS NFR BLD: 47.4 %
PERFORMED ON: ABNORMAL
PLATELET # BLD AUTO: 256 K/UL (ref 135–450)
PMV BLD AUTO: 8.1 FL (ref 5–10.5)
POTASSIUM SERPL-SCNC: 4.1 MMOL/L (ref 3.5–5.1)
PROT SERPL-MCNC: 8 G/DL (ref 6.4–8.2)
PROTHROMBIN TIME: 13 SEC (ref 11.9–14.9)
RBC # BLD AUTO: 4.75 M/UL (ref 4–5.2)
SODIUM SERPL-SCNC: 134 MMOL/L (ref 136–145)
TROPONIN, HIGH SENSITIVITY: 11 NG/L (ref 0–14)
WBC # BLD AUTO: 5.3 K/UL (ref 4–11)

## 2024-05-11 PROCEDURE — 85025 COMPLETE CBC W/AUTO DIFF WBC: CPT

## 2024-05-11 PROCEDURE — 80053 COMPREHEN METABOLIC PANEL: CPT

## 2024-05-11 PROCEDURE — 99285 EMERGENCY DEPT VISIT HI MDM: CPT

## 2024-05-11 PROCEDURE — 70450 CT HEAD/BRAIN W/O DYE: CPT

## 2024-05-11 PROCEDURE — 96374 THER/PROPH/DIAG INJ IV PUSH: CPT

## 2024-05-11 PROCEDURE — 6360000002 HC RX W HCPCS: Performed by: EMERGENCY MEDICINE

## 2024-05-11 PROCEDURE — 84484 ASSAY OF TROPONIN QUANT: CPT

## 2024-05-11 PROCEDURE — 6370000000 HC RX 637 (ALT 250 FOR IP): Performed by: EMERGENCY MEDICINE

## 2024-05-11 PROCEDURE — 93005 ELECTROCARDIOGRAM TRACING: CPT | Performed by: EMERGENCY MEDICINE

## 2024-05-11 PROCEDURE — 85610 PROTHROMBIN TIME: CPT

## 2024-05-11 PROCEDURE — 71045 X-RAY EXAM CHEST 1 VIEW: CPT

## 2024-05-11 PROCEDURE — 6360000004 HC RX CONTRAST MEDICATION: Performed by: EMERGENCY MEDICINE

## 2024-05-11 PROCEDURE — 70498 CT ANGIOGRAPHY NECK: CPT

## 2024-05-11 PROCEDURE — 84703 CHORIONIC GONADOTROPIN ASSAY: CPT

## 2024-05-11 PROCEDURE — 36415 COLL VENOUS BLD VENIPUNCTURE: CPT

## 2024-05-11 PROCEDURE — 96375 TX/PRO/DX INJ NEW DRUG ADDON: CPT

## 2024-05-11 RX ORDER — MECLIZINE HCL 12.5 MG/1
25 TABLET ORAL ONCE
Status: COMPLETED | OUTPATIENT
Start: 2024-05-11 | End: 2024-05-11

## 2024-05-11 RX ORDER — DIPHENHYDRAMINE HYDROCHLORIDE 50 MG/ML
25 INJECTION INTRAMUSCULAR; INTRAVENOUS ONCE
Status: COMPLETED | OUTPATIENT
Start: 2024-05-11 | End: 2024-05-11

## 2024-05-11 RX ORDER — DEXAMETHASONE SODIUM PHOSPHATE 10 MG/ML
10 INJECTION INTRAMUSCULAR; INTRAVENOUS ONCE
Status: COMPLETED | OUTPATIENT
Start: 2024-05-11 | End: 2024-05-11

## 2024-05-11 RX ORDER — ACETAMINOPHEN 500 MG
1000 TABLET ORAL ONCE
Status: COMPLETED | OUTPATIENT
Start: 2024-05-11 | End: 2024-05-11

## 2024-05-11 RX ORDER — PROCHLORPERAZINE EDISYLATE 5 MG/ML
10 INJECTION INTRAMUSCULAR; INTRAVENOUS ONCE
Status: COMPLETED | OUTPATIENT
Start: 2024-05-11 | End: 2024-05-11

## 2024-05-11 RX ORDER — MECLIZINE HYDROCHLORIDE 25 MG/1
25 TABLET ORAL 3 TIMES DAILY PRN
Qty: 15 TABLET | Refills: 0 | Status: SHIPPED | OUTPATIENT
Start: 2024-05-11 | End: 2024-05-21

## 2024-05-11 RX ADMIN — ACETAMINOPHEN 1000 MG: 500 TABLET ORAL at 12:22

## 2024-05-11 RX ADMIN — MECLIZINE 25 MG: 12.5 TABLET ORAL at 11:25

## 2024-05-11 RX ADMIN — PROCHLORPERAZINE EDISYLATE 10 MG: 5 INJECTION INTRAMUSCULAR; INTRAVENOUS at 12:22

## 2024-05-11 RX ADMIN — IOPAMIDOL 75 ML: 755 INJECTION, SOLUTION INTRAVENOUS at 11:00

## 2024-05-11 RX ADMIN — DEXAMETHASONE SODIUM PHOSPHATE 10 MG: 10 INJECTION INTRAMUSCULAR; INTRAVENOUS at 12:22

## 2024-05-11 RX ADMIN — DIPHENHYDRAMINE HYDROCHLORIDE 25 MG: 50 INJECTION INTRAMUSCULAR; INTRAVENOUS at 12:22

## 2024-05-11 ASSESSMENT — ENCOUNTER SYMPTOMS
NAUSEA: 0
BACK PAIN: 0
EYE REDNESS: 0
DIARRHEA: 0
EYE PAIN: 0
SHORTNESS OF BREATH: 0
COUGH: 0
CONSTIPATION: 0
RHINORRHEA: 0
ABDOMINAL PAIN: 0
VOMITING: 0

## 2024-05-11 ASSESSMENT — PAIN - FUNCTIONAL ASSESSMENT: PAIN_FUNCTIONAL_ASSESSMENT: 0-10

## 2024-05-11 ASSESSMENT — LIFESTYLE VARIABLES
HOW MANY STANDARD DRINKS CONTAINING ALCOHOL DO YOU HAVE ON A TYPICAL DAY: 1 OR 2
HOW OFTEN DO YOU HAVE A DRINK CONTAINING ALCOHOL: 2-3 TIMES A WEEK

## 2024-05-11 ASSESSMENT — PAIN SCALES - GENERAL: PAINLEVEL_OUTOF10: 10

## 2024-05-11 NOTE — ED NOTES
PIV removed. Pt verbalizes understanding of discharge instructions. Taken to exit via wheelchair with belongings in tow.

## 2024-05-11 NOTE — ED NOTES
Pt ambulated to bathroom with steady gait, stating she has a headache 10/10 pain, medicated per eMAR. Cycling on monitor.

## 2024-05-11 NOTE — ED PROVIDER NOTES
[facility] EMERGENCY DEPARTMENT  EMERGENCY DEPARTMENT ENCOUNTER        Pt Name: Landy Whitten  MRN: [id number]  Birthdate 1960  Date of evaluation: 5/11/2024  Provider: Fernanda Colin DO  PCP: Gunner Amezquita MD  Note Started: 10:27 AM EDT 5/11/24    CHIEF COMPLAINT      Vertigo, Headache    HISTORY OF PRESENT ILLNESS: 1 or more Elements     Chief Complaint   Patient presents with    Dizziness     Dizziness, headache, and blurred vision onset yesterday, pt states she has high blood pressure.     History from : Patient  Limitations to history : None    Landy Whitten is a 64 y.o. female who presents to the emergency department secondary to concern for dizziness and headache. Reports that her symptoms began suddenly around 11 AM yesterday. She thought that it would get better or go away but actually worsened throughout the night to this morning. Reports that she was doing water aerobics yesterday just prior to when symptoms occurred.  However, she states that she does water aerobics 3 times a week and never has any issues and yesterday was not any different. Denies any previous history of stroke.  Denies taking any blood thinners. Her symptoms of dizziness are described as room-spinning sensations that are not relieved whether she lies still, closes eyes or moves around.  She also reports a 10/10 headache and reports history of migraines, however she has not had a migraine in years and this one feels different.    She also reports that she had a right knee replacement done a year ago and has difficulty with her gait at baseline, however she is having more difficulty with both her left and right lower extremities and feels different somehow this time.    Past medical history noted below. Aside from what is stated above denies any other symptoms or modifying factors.   reports that she has quit smoking. She has never used smokeless tobacco. She reports current alcohol use. She  Vertigo    2. Acute non intractable tension-type headache          DISPOSITION/PLAN   DISPOSITION Decision To Discharge 05/11/2024 01:21:44 PM      PATIENT REFERRED TO:  Gunner Amezquita MD  [address] [facility]  [address]  [facility] [id number] [phone]    Schedule an appointment as soon as possible for a visit       [facility] Emergency Department  [address]  [place] [address]  [phone]  Go to   As needed, If symptoms worsen      DISCHARGE MEDICATIONS:  Discharge Medication List as of 5/11/2024  1:27 PM        START taking these medications    Details   meclizine (ANTIVERT) 25 MG tablet Take 1 tablet by mouth 3 times daily as needed for Dizziness or Nausea, Disp-15 tablet, R-0Normal                (Please note that portions of this note were completed with a voice recognition program.  Efforts were made to edit the dictations but occasionally words are mis-transcribed.)    Fernanda Colin DO (electronically signed)  Attending Emergency Physician        Fernanda Colin DO  05/12/24 2388

## 2024-05-12 LAB
EKG ATRIAL RATE: 69 BPM
EKG DIAGNOSIS: NORMAL
EKG P AXIS: 68 DEGREES
EKG P-R INTERVAL: 170 MS
EKG Q-T INTERVAL: 388 MS
EKG QRS DURATION: 82 MS
EKG QTC CALCULATION (BAZETT): 415 MS
EKG R AXIS: 54 DEGREES
EKG T AXIS: 51 DEGREES
EKG VENTRICULAR RATE: 69 BPM

## 2024-05-12 PROCEDURE — 93010 ELECTROCARDIOGRAM REPORT: CPT | Performed by: INTERNAL MEDICINE

## 2024-05-21 ENCOUNTER — TRANSCRIBE ORDERS (OUTPATIENT)
Dept: ADMINISTRATIVE | Age: 64
End: 2024-05-21

## 2024-05-21 DIAGNOSIS — E04.1 THYROID NODULE: Primary | ICD-10-CM

## 2024-05-23 ENCOUNTER — HOSPITAL ENCOUNTER (OUTPATIENT)
Dept: ULTRASOUND IMAGING | Age: 64
Discharge: HOME | End: 2024-05-23
Payer: COMMERCIAL

## 2024-05-23 DIAGNOSIS — E04.1 THYROID NODULE: ICD-10-CM

## 2024-05-23 PROCEDURE — 76536 US EXAM OF HEAD AND NECK: CPT

## 2024-05-28 NOTE — PLAN OF CARE
there is a 95% sensitivity for UVL    Balance:  [] WNL      [] NT       [x] Dysfunction noted  Comment:     RAJAT see above    Gait Testing:   tested  Level of Assistance needed:  Independent  Gait Deviations (firm surface/linoleum):    None  Assistive Device Used:  No AD    Positional Testing    Nystagmus Direction Duration Vertigo Duration   Right Loaded [place] Hallpike Neg      Left Loaded Ade Hallpike Neg      Right Sidelying Test Neg      Left Sidelying Test Neg      Right Roll Test       Left Roll Test                  Exercises/Interventions     Therapeutic Ex (17501)  Resistance Sets/time Reps Notes/Cues/Progressions                                      NMR re-education (96824)       Right ade lambert pike (-)  Roll test R/L neg    5/29   VOR V/H  VOR cancellation  Body circles  EC on Airex              Therapeutic Activity (14349)                            Manual Intervention (99557) Time:                 Modalities:    No modalities applied this session    Education/Home Exercise Program: Access Code: [email or web address]  URL: [email or web address]  Date: 05/29/2024  Prepared by: Saji Senior    Exercises  - Seated Gaze Stabilization with Head Rotation  - 1 x daily - 7 x weekly - 3 sets - 10 reps - 60s hold  - Seated Gaze Stabilization with Head Nod  - 1 x daily - 7 x weekly - 3 sets - 10 reps - 60s hold      ASSESSMENT   Assessment:   Landy PENN Fish is a 64 y.o.  female presenting today to Outpatient PT with signs and symptoms consistent with unilateral vestibular hypofunction, imbalance, mild dizziness with rapid head movement.    Pt. presents with the functional impairments and activity limitations listed below and would benefit from Outpatient PT to address the below impairments to return to previous level of function.     Functional Impairments:  Problem List/Functional Limitations:  [] BPPV    [] Right [] Posterior Canal [] Canalithiasis    [] Left  [] Horizontal Canal [] Cupulolithiasis

## 2024-05-29 ENCOUNTER — HOSPITAL ENCOUNTER (OUTPATIENT)
Dept: PHYSICAL THERAPY | Age: 64
Setting detail: THERAPIES SERIES
Discharge: HOME | End: 2024-05-29
Payer: COMMERCIAL

## 2024-05-29 DIAGNOSIS — H83.2X1 VESTIBULAR HYPOFUNCTION OF RIGHT EAR: Primary | ICD-10-CM

## 2024-05-29 DIAGNOSIS — R26.89 IMBALANCE: ICD-10-CM

## 2024-05-29 PROCEDURE — 97161 PT EVAL LOW COMPLEX 20 MIN: CPT

## 2024-05-29 PROCEDURE — 97112 NEUROMUSCULAR REEDUCATION: CPT

## 2024-05-29 PROCEDURE — 95992 CANALITH REPOSITIONING PROC: CPT

## 2024-05-29 PROCEDURE — 97530 THERAPEUTIC ACTIVITIES: CPT

## 2024-06-12 ENCOUNTER — HOSPITAL ENCOUNTER (OUTPATIENT)
Dept: PHYSICAL THERAPY | Age: 64
Setting detail: THERAPIES SERIES
Discharge: HOME | End: 2024-06-12
Payer: COMMERCIAL

## 2024-06-12 PROCEDURE — 97112 NEUROMUSCULAR REEDUCATION: CPT

## 2024-06-12 NOTE — FLOWSHEET NOTE
(07443) as indicated to include: Passive Range of Motion, Gr I-IV mobilizations, Soft Tissue Mobilization, Trigger Point Release, and Myofascial Release  Modalities as needed that may include: Cryotherapy, Electrical Stimulation, Biofeedback, Thermal Agents, and Ultrasound  Patient education on postural re-education, activity modification, progression of HEP, and vestibular fuction/BPPV  CRP for assessment, treatment and education of BPPV    Plan:  Progress habituation and balance     Electronically Signed by Saji Senior PT  Date: 06/12/2024     Note: Portions of this note have been templated and/or copied from initial evaluation, reassessments and prior notes for documentation efficiency.    Note: If patient does not return for scheduled/recommended follow up visits, this note will serve as a discharge from care along with the most recent update on progress.    Vestibular Evaluation

## 2024-06-14 ENCOUNTER — APPOINTMENT (OUTPATIENT)
Dept: PHYSICAL THERAPY | Age: 64
End: 2024-06-14
Payer: COMMERCIAL

## 2024-06-19 ENCOUNTER — HOSPITAL ENCOUNTER (OUTPATIENT)
Dept: PHYSICAL THERAPY | Age: 64
Setting detail: THERAPIES SERIES
Discharge: HOME | End: 2024-06-19
Payer: COMMERCIAL

## 2024-06-19 PROCEDURE — 97112 NEUROMUSCULAR REEDUCATION: CPT

## 2024-06-19 NOTE — FLOWSHEET NOTE
[facility] - Outpatient Rehabilitation and Therapy [address], [place] [address] office: [phone] fax: [phone]     Physical Therapy: TREATMENT/PROGRESS NOTE   Patient: Landy Whitten (64 y.o. female)   Examination Date: 2024   :  1960 MRN: [id number]   Visit #: 3   Insurance Allowable Auth Needed   Mn []Yes    [x]No    Insurance: Payor: [employer] / Plan: [employer] [address] / Product Type: *No Product type* /   Insurance ID: [id number] - (Commercial)  Secondary Insurance (if applicable):    Treatment Diagnosis:     ICD-10-CM    1. Vestibular hypofunction of right ear  H83.2X1       2. Imbalance  R26.89          Medical Diagnosis:  Vertigo [R42]   Referring Physician: Gunner Amezquita MD  PCP: Gunner Amezquita MD       Plan of care signed (Y/N): N    Date of Patient follow up with Physician:  DAGOBERTO     Progress Report/POC: NO  POC update due: (10 visits /OR AUTH LIMITS, whichever is less)  2024                                             Precautions/ Contra-indications:           Latex allergy:  NO  Pacemaker:    NO  Contraindications for Manipulation: NA  Date of Surgery: NA  Other:    Red Flags:  None    C-SSRS Triggered by Intake questionnaire:   Patient answered 'NO' to both behavioral questions on intake.  No further screening warranted    Preferred Language for Healthcare:   [x] English       [] other:    SUBJECTIVE EXAMINATION     Patient stated complaint/comment:  Patient reports that she has remained swimming and carrying out ADLs without dizziness. Says she is more conscious regarding her inner ears and pushing herself.      Patient reports that she has just been feeling more off than being dizzy since the initial eval.      Patient reports that she was at the pool for 3 hours walking and  swimming . Says she did one lap and began experiencing vertigo symptoms. States she went to ER and her Blood pressure increased during the

## 2024-06-21 ENCOUNTER — APPOINTMENT (OUTPATIENT)
Dept: PHYSICAL THERAPY | Age: 64
End: 2024-06-21
Payer: COMMERCIAL

## 2024-07-30 ENCOUNTER — HOSPITAL ENCOUNTER (OUTPATIENT)
Dept: PHYSICAL THERAPY | Age: 64
Setting detail: THERAPIES SERIES
Discharge: HOME | End: 2024-07-30
Payer: COMMERCIAL

## 2024-07-30 PROCEDURE — 97530 THERAPEUTIC ACTIVITIES: CPT | Performed by: PHYSICAL THERAPIST

## 2024-07-30 PROCEDURE — 97750 PHYSICAL PERFORMANCE TEST: CPT | Performed by: PHYSICAL THERAPIST

## 2024-07-30 NOTE — PLAN OF CARE
[] Progressing: [x] Met: [] Not Met: [] Adjusted          Overall Progression Towards Functional goals/ Treatment Progress Update:  [x] Patient is progressing as expected towards functional goals listed.    [] Progression is slowed due to complexities/Impairments listed.  [] Progression has been slowed due to co-morbidities.  [] Plan just implemented, too soon to assess goals progression <30days   [] Goals require adjustment due to lack of progress  [] Patient is not progressing as expected and requires additional follow up with physician  [] Other    Prognosis for POC: [x] Good [] Fair  [] Poor      Patient requires continued skilled intervention: [x] Yes  [] No    Treatment/Activity Tolerance:  [x] Patient able to complete treatment  [] Patient limited by fatigue  [] Patient limited by pain     [] Patient limited by other medical complications  [x] Other: 1 year post op right TKR.  R-assessment was completed today.  ROM is progressing well.  Biodex isometric testing was completed today.  Significant quad strength deficits are present.  Hip strength deficits are present as well.  Functional testing is progressing well.  PT reviewed appropriate gym progressions including: increased loading, appropriate exercises, periodization.  Pt verbalized good understanding of these instructions.  Pt advised to return at 1 year post-op son for re-assessment.      PLAN:   [x] Continue per plan of care [] Alter current plan (see comments above)  [] Plan of care initiated [] Hold pending MD visit [] Discharge          Electronically signed by:  Ihsan Goldberg, PT      Note: If patient does not return for scheduled/ recommended follow up visits, this note will serve as a discharge from care along with most recent update on progress.

## 2024-07-30 NOTE — PLAN OF CARE
The [facility] - Orthopaedic and Sports Medicine [place],  Sports Performance and Rehabilitation, [address] [address]  Phone: [phone]  Fax: [phone]    Physical Therapy Re-Certification Plan of Care    Dear Gianfranco Rosales MD  ,    We had the pleasure of treating the following patient for physical therapy services at [facility]. A summary of our findings can be found in the updated assessment below.  This includes our plan of care.  If you have any questions or concerns regarding these findings, please do not hesitate to contact me at the office phone number checked above.  Thank you for the referral.     Physician Signature:________________________________Date:__________________  By signing above (or electronic signature), therapist's plan is approved by physician        Overall Response to Treatment:   [x]Patient is responding well to treatment and improvement is noted with regards to goals   []Patient should continue to improve in reasonable time if they continue HEP   []Patient has plateaued and is no longer responding to skilled PT intervention    []Patient is getting worse and would benefit from return to referring MD   []Patient unable to adhere to initial POC   []Other:     Total Visits: 24     Recommendation:    [] Continue PT x / wk for  weeks.   [] Hold PT, pending MD visit   [x] Discharge to HEP. Follow up with PT or MD PRN.       Physical Therapy Daily Treatment Note  Date:  2024    Patient Name:  Landy Whitten    :  1960  MRN: [id number]  Restrictions/Precautions:    Medical/Treatment Diagnosis Information:  Knee pain [M25.569]  Treatment Diagnosis: OA (R) (M17.11), Stiffness (R) (M25.661), Pain (R) (M25.561), Difficulty Walking (R26.2), Gait Abnormality (R26.9), Weakness (R53.1)  R TKR/lateral release 23  Meds:  Insurance/Certification information:  PT Insurance Information:

## 2024-08-06 ENCOUNTER — OFFICE VISIT (OUTPATIENT)
Dept: ORTHOPEDIC SURGERY | Age: 64
End: 2024-08-06
Payer: COMMERCIAL

## 2024-08-06 VITALS — WEIGHT: 176 LBS | BODY MASS INDEX: 33.23 KG/M2 | HEIGHT: 61 IN

## 2024-08-06 DIAGNOSIS — Z96.651 S/P TKR (TOTAL KNEE REPLACEMENT) USING CEMENT, RIGHT: Primary | ICD-10-CM

## 2024-08-06 PROCEDURE — G8417 CALC BMI ABV UP PARAM F/U: HCPCS | Performed by: PHYSICIAN ASSISTANT

## 2024-08-06 PROCEDURE — G8428 CUR MEDS NOT DOCUMENT: HCPCS | Performed by: PHYSICIAN ASSISTANT

## 2024-08-06 PROCEDURE — 99213 OFFICE O/P EST LOW 20 MIN: CPT | Performed by: PHYSICIAN ASSISTANT

## 2024-08-06 PROCEDURE — 1036F TOBACCO NON-USER: CPT | Performed by: PHYSICIAN ASSISTANT

## 2024-08-06 PROCEDURE — 3017F COLORECTAL CA SCREEN DOC REV: CPT | Performed by: PHYSICIAN ASSISTANT

## 2024-08-09 NOTE — PROGRESS NOTES
Date:  2024    Name:  Landy Whitten  Address:  [address] [place] [id number]    :  1960      Age:   64 y.o.        Chief Complaint    Knee Pain (F/u r knee )      History of Present Illness:  Landy Whitten is a 64 y.o. female who presents for a 1 year checkup on her right total knee replacement.  She had this surgery conducted on 2023 by Dr. Gianfranco Rosales.  She states she is doing extremely well and denies having any pain or instability.  She has been able to return to some light recreational activity and exercises several times a week.  Conservative treatment includes: rest, ice, elevation, home exercises, activity modification, and OTC medications as needed.  The patient denies any catching, giving way, joint locking, numbness, paresthesias, or weakness.             Past Medical History:   Diagnosis Date    Asthma     Chronic back pain     Histoplasmosis     Kidney disease     PT STATES LOSING PROTEIN, BEEN FINE FOR 25 YRS        Past Surgical History:   Procedure Laterality Date    BACK SURGERY      FUSED     BUNIONECTOMY      OTHER SURGICAL HISTORY      PT STATES 13 TOTAL SURGERIES, WON'T GO INTO DETAIL    SHOULDER SURGERY      3 ROTATOR CUFF SURGERIES    TOTAL KNEE ARTHROPLASTY Right 2023    RIGHT TOTAL KNEE REPLACEMENT performed by Gianfranco Rosales MD at [facility] OR       No family history on file.    Social History     Socioeconomic History    Marital status:      Spouse name: None    Number of children: None    Years of education: None    Highest education level: None   Tobacco Use    Smoking status: Former    Smokeless tobacco: Never   Substance and Sexual Activity    Alcohol use: Yes     Comment: 2-3 DRINKS A WEEK, AWARE NOT TO DRINK DAY BEFORE OR DOS-DG    Drug use: Not Currently       Current Outpatient Medications   Medication Sig Dispense Refill    meloxicam (MOBIC) 15 MG tablet Take 1 tablet by mouth daily 30 tablet 0    aspirin 81 MG EC

## 2025-03-25 ENCOUNTER — HOSPITAL ENCOUNTER (OUTPATIENT)
Dept: WOMENS IMAGING | Age: 65
Discharge: HOME | End: 2025-03-25
Payer: COMMERCIAL

## 2025-03-25 VITALS — WEIGHT: 180 LBS | BODY MASS INDEX: 33.13 KG/M2 | HEIGHT: 62 IN

## 2025-03-25 DIAGNOSIS — Z12.31 VISIT FOR SCREENING MAMMOGRAM: ICD-10-CM

## 2025-03-25 PROCEDURE — 77067 SCR MAMMO BI INCL CAD: CPT

## (undated) DEVICE — DUAL CUT SAGITTAL BLADE

## (undated) DEVICE — SUTURE VCRL + SZ 0 L18IN ABSRB UD L36MM CT-1 1/2 CIR VCP840D

## (undated) DEVICE — SYRINGE 60ML IRRIG PISTON TOMEY

## (undated) DEVICE — PENCIL SMK EVAC TELSCP 3 M TBNG

## (undated) DEVICE — NEEDLE, QUINCKE, 18GX3.5": Brand: MEDLINE

## (undated) DEVICE — SUTURE MCRYL + SZ 4-0 L27IN ABSRB UD L19MM PS-2 3/8 CIR MCP426H

## (undated) DEVICE — SUTURE ETHBND EXCEL SZ 0 L18IN NONABSORBABLE GRN L36MM CT-1 CX21D

## (undated) DEVICE — SYRINGE CATH TIP 50ML

## (undated) DEVICE — 3 BONE CEMENT MIXER: Brand: MIXEVAC

## (undated) DEVICE — GLOVE SURG SZ 7 CRM LTX FREE POLYISOPRENE POLYMER BEAD ANTI

## (undated) DEVICE — 450 ML BOTTLE OF 0.05% CHLORHEXIDINE GLUCONATE IN 99.95% STERILE WATER FOR IRRIGATION, USP AND APPLICATOR.: Brand: IRRISEPT ANTIMICROBIAL WOUND LAVAGE

## (undated) DEVICE — NEEDLE HYPO 18GA L1.5IN PNK POLYPR HUB S STL REG BVL STR

## (undated) DEVICE — SOLUTION IV 100ML 0.9% SOD CHL PH5 CONTAIN DEHP VIAFLX QP

## (undated) DEVICE — DRESSING FOAM W4XL12IN SIL RECT ADH WTRPRF FLM BK W/ BORD

## (undated) DEVICE — UNDERGLOVE SURG SZ 9 FNGR THK0.21MIL GRN LTX BEAD CUF

## (undated) DEVICE — GLOVE SURG SZ 7 L12IN FNGR THK79MIL GRN LTX FREE

## (undated) DEVICE — DRESSING FOAM 4X8IN DISP POSTOP MEPILEX BORD AG

## (undated) DEVICE — SET IV PMP 1 PRT CK VLV SPL SEPT PRTS 2 PC M LL 20 GTT LEN

## (undated) DEVICE — SOLUTION IV 1000ML 0.9% SOD CHL

## (undated) DEVICE — COUNTER NDL 40 COUNT HLD 70 NUM FOAM BLK SGL MAG W BLDE REMV

## (undated) DEVICE — STRIP,CLOSURE,WOUND,MEDI-STRIP,1/2X4: Brand: MEDLINE

## (undated) DEVICE — UNDERGLOVE SURG SZ 8.5 FNGR THK0.21MIL GRN LTX BEAD CUF

## (undated) DEVICE — SUTURE MCRYL SZ 2-0 L18IN ABSRB VLT L36MM CT-1 1/2 CIR Y739D

## (undated) DEVICE — SOLUTION IV 1000ML LAC RINGERS PH 6.5 INJ USP VIAFLX PLAS

## (undated) DEVICE — TOWEL,STOP FLAG GOLD N-W: Brand: MEDLINE

## (undated) DEVICE — PAD,ABDOMINAL,5"X9",ST,LF,25/BX: Brand: MEDLINE INDUSTRIES, INC.

## (undated) DEVICE — SYRINGE MED 10ML LUERLOCK TIP W/O SFTY DISP

## (undated) DEVICE — 3M™ COBAN™ NL STERILE NON-LATEX SELF-ADHERENT WRAP, 2086S, 6 IN X 5 YD (15 CM X 4,5 M), 12 ROLLS/CASE: Brand: 3M™ COBAN™

## (undated) DEVICE — TOTAL KNEE: Brand: MEDLINE INDUSTRIES, INC.

## (undated) DEVICE — BLADE,CARBON-STEEL,10,STRL,DISPOSABLE,TB: Brand: MEDLINE

## (undated) DEVICE — BASIC SINGLE BASIN 1-LF: Brand: MEDLINE INDUSTRIES, INC.

## (undated) DEVICE — SST BUR, WIRE PASS DRILL, 2 FLUTES, MED., 2MM DIA.: Brand: MICROAIRE®

## (undated) DEVICE — ELECTRODE PT RET AD L9FT HI MOIST COND ADH HYDRGEL CORDED

## (undated) DEVICE — TOWEL,OR,DSP,ST,BLUE,DLX,8/PK,10PK/CS: Brand: MEDLINE

## (undated) DEVICE — APPLICATOR MEDICATED 26 CC SOLUTION HI LT ORNG CHLORAPREP